# Patient Record
Sex: FEMALE | Race: WHITE | NOT HISPANIC OR LATINO | Employment: FULL TIME | URBAN - METROPOLITAN AREA
[De-identification: names, ages, dates, MRNs, and addresses within clinical notes are randomized per-mention and may not be internally consistent; named-entity substitution may affect disease eponyms.]

---

## 2018-08-07 ENCOUNTER — LAB REQUISITION (OUTPATIENT)
Dept: LAB | Facility: HOSPITAL | Age: 48
End: 2018-08-07

## 2018-08-07 DIAGNOSIS — Z00.00 ROUTINE GENERAL MEDICAL EXAMINATION AT A HEALTH CARE FACILITY: ICD-10-CM

## 2018-08-07 PROCEDURE — 36415 COLL VENOUS BLD VENIPUNCTURE: CPT

## 2019-07-22 ENCOUNTER — LAB (OUTPATIENT)
Dept: LAB | Facility: HOSPITAL | Age: 49
End: 2019-07-22

## 2019-07-22 ENCOUNTER — TRANSCRIBE ORDERS (OUTPATIENT)
Dept: ADMINISTRATIVE | Facility: HOSPITAL | Age: 49
End: 2019-07-22

## 2019-07-22 DIAGNOSIS — M25.511 RIGHT SHOULDER PAIN, UNSPECIFIED CHRONICITY: Primary | ICD-10-CM

## 2019-07-22 DIAGNOSIS — M25.511 RIGHT SHOULDER PAIN, UNSPECIFIED CHRONICITY: ICD-10-CM

## 2019-07-22 LAB
ALBUMIN SERPL-MCNC: 4.6 G/DL (ref 3.5–5.2)
ALBUMIN/GLOB SERPL: 1.7 G/DL
ALP SERPL-CCNC: 97 U/L (ref 39–117)
ALT SERPL W P-5'-P-CCNC: 22 U/L (ref 1–33)
ANION GAP SERPL CALCULATED.3IONS-SCNC: 16.1 MMOL/L (ref 5–15)
AST SERPL-CCNC: 24 U/L (ref 1–32)
BILIRUB SERPL-MCNC: 0.3 MG/DL (ref 0.2–1.2)
BUN BLD-MCNC: 14 MG/DL (ref 6–20)
BUN/CREAT SERPL: 17.3 (ref 7–25)
CALCIUM SPEC-SCNC: 9.6 MG/DL (ref 8.6–10.5)
CHLORIDE SERPL-SCNC: 99 MMOL/L (ref 98–107)
CO2 SERPL-SCNC: 22.9 MMOL/L (ref 22–29)
CREAT BLD-MCNC: 0.81 MG/DL (ref 0.57–1)
DEPRECATED RDW RBC AUTO: 47.7 FL (ref 37–54)
ERYTHROCYTE [DISTWIDTH] IN BLOOD BY AUTOMATED COUNT: 13.7 % (ref 12.3–15.4)
GFR SERPL CREATININE-BSD FRML MDRD: 75 ML/MIN/1.73
GLOBULIN UR ELPH-MCNC: 2.7 GM/DL
GLUCOSE BLD-MCNC: 83 MG/DL (ref 65–99)
HCT VFR BLD AUTO: 43.5 % (ref 34–46.6)
HGB BLD-MCNC: 13.8 G/DL (ref 12–15.9)
MCH RBC QN AUTO: 30.2 PG (ref 26.6–33)
MCHC RBC AUTO-ENTMCNC: 31.7 G/DL (ref 31.5–35.7)
MCV RBC AUTO: 95.2 FL (ref 79–97)
PLATELET # BLD AUTO: 214 10*3/MM3 (ref 140–450)
PMV BLD AUTO: 11.8 FL (ref 6–12)
POTASSIUM BLD-SCNC: 3.8 MMOL/L (ref 3.5–5.2)
PROT SERPL-MCNC: 7.3 G/DL (ref 6–8.5)
RBC # BLD AUTO: 4.57 10*6/MM3 (ref 3.77–5.28)
SODIUM BLD-SCNC: 138 MMOL/L (ref 136–145)
WBC NRBC COR # BLD: 8.19 10*3/MM3 (ref 3.4–10.8)

## 2019-07-22 PROCEDURE — 36415 COLL VENOUS BLD VENIPUNCTURE: CPT

## 2019-07-22 PROCEDURE — 85027 COMPLETE CBC AUTOMATED: CPT

## 2019-07-22 PROCEDURE — 80053 COMPREHEN METABOLIC PANEL: CPT

## 2021-01-29 ENCOUNTER — OFFICE VISIT (OUTPATIENT)
Dept: INTERNAL MEDICINE | Facility: CLINIC | Age: 51
End: 2021-01-29

## 2021-01-29 ENCOUNTER — HOSPITAL ENCOUNTER (OUTPATIENT)
Dept: ULTRASOUND IMAGING | Facility: HOSPITAL | Age: 51
Discharge: HOME | End: 2021-01-29
Admitting: INTERNAL MEDICINE

## 2021-01-29 VITALS
BODY MASS INDEX: 30.9 KG/M2 | DIASTOLIC BLOOD PRESSURE: 80 MMHG | SYSTOLIC BLOOD PRESSURE: 126 MMHG | TEMPERATURE: 97.1 F | HEART RATE: 75 BPM | WEIGHT: 181 LBS | RESPIRATION RATE: 18 BRPM | HEIGHT: 64 IN | OXYGEN SATURATION: 98 %

## 2021-01-29 DIAGNOSIS — M79.605 LEG PAIN, LEFT: Primary | ICD-10-CM

## 2021-01-29 PROCEDURE — 93970 EXTREMITY STUDY: CPT

## 2021-01-29 PROCEDURE — 99214 OFFICE O/P EST MOD 30 MIN: CPT | Performed by: INTERNAL MEDICINE

## 2021-01-29 RX ORDER — MELOXICAM 7.5 MG/1
15 TABLET ORAL DAILY
Qty: 60 TABLET | Refills: 2 | Status: SHIPPED | OUTPATIENT
Start: 2021-01-29 | End: 2021-07-09

## 2021-01-29 RX ORDER — SPIRONOLACTONE 25 MG/1
25 TABLET ORAL DAILY
COMMUNITY

## 2021-01-29 RX ORDER — PIMECROLIMUS 10 MG/G
CREAM TOPICAL
COMMUNITY
Start: 2020-11-24

## 2021-01-29 RX ORDER — DIPHENOXYLATE HYDROCHLORIDE AND ATROPINE SULFATE 2.5; .025 MG/1; MG/1
1 TABLET ORAL DAILY
COMMUNITY
End: 2021-01-29

## 2021-01-29 RX ORDER — MULTIVIT WITH MINERALS/LUTEIN
250 TABLET ORAL DAILY
COMMUNITY

## 2021-01-29 RX ORDER — TRAZODONE HYDROCHLORIDE 50 MG/1
50 TABLET ORAL
COMMUNITY
Start: 2021-01-25 | End: 2021-04-21

## 2021-01-29 RX ORDER — CLOBETASOL PROPIONATE 0.5 MG/G
CREAM TOPICAL
COMMUNITY
Start: 2020-10-29

## 2021-01-29 RX ORDER — ESTRADIOL 0.05 MG/D
1 FILM, EXTENDED RELEASE TRANSDERMAL 2 TIMES WEEKLY
COMMUNITY
Start: 2020-12-07 | End: 2022-11-11

## 2021-02-02 ENCOUNTER — TELEPHONE (OUTPATIENT)
Dept: INTERNAL MEDICINE | Facility: CLINIC | Age: 51
End: 2021-02-02

## 2021-02-02 NOTE — TELEPHONE ENCOUNTER
[facility] NEEDS A P.A ON THE FOLLOWING MEDICATION:     meloxicam (Mobic) 7.5 MG tablet [[id number]] (Order [id number])    PHARMACY:  [id number] [place] [address] 100 - [phone] [facility] [phone] FX

## 2021-03-24 ENCOUNTER — BULK ORDERING (OUTPATIENT)
Dept: CASE MANAGEMENT | Facility: OTHER | Age: 51
End: 2021-03-24

## 2021-03-24 DIAGNOSIS — Z23 IMMUNIZATION DUE: ICD-10-CM

## 2021-04-21 RX ORDER — TRAZODONE HYDROCHLORIDE 50 MG/1
TABLET ORAL
Qty: 90 TABLET | Refills: 2 | Status: SHIPPED | OUTPATIENT
Start: 2021-04-21 | End: 2021-11-22

## 2021-05-25 ENCOUNTER — TRANSCRIBE ORDERS (OUTPATIENT)
Dept: LAB | Facility: SURGERY CENTER | Age: 51
End: 2021-05-25

## 2021-05-25 ENCOUNTER — OFFICE VISIT (OUTPATIENT)
Dept: GASTROENTEROLOGY | Facility: CLINIC | Age: 51
End: 2021-05-25

## 2021-05-25 ENCOUNTER — PREP FOR SURGERY (OUTPATIENT)
Dept: SURGERY | Facility: SURGERY CENTER | Age: 51
End: 2021-05-25

## 2021-05-25 VITALS
WEIGHT: 165 LBS | HEIGHT: 64 IN | BODY MASS INDEX: 28.17 KG/M2 | OXYGEN SATURATION: 100 % | DIASTOLIC BLOOD PRESSURE: 82 MMHG | HEART RATE: 69 BPM | SYSTOLIC BLOOD PRESSURE: 140 MMHG | TEMPERATURE: 97.5 F

## 2021-05-25 DIAGNOSIS — Z01.818 OTHER SPECIFIED PRE-OPERATIVE EXAMINATION: Primary | ICD-10-CM

## 2021-05-25 DIAGNOSIS — R13.19 ESOPHAGEAL DYSPHAGIA: Primary | ICD-10-CM

## 2021-05-25 PROCEDURE — 99214 OFFICE O/P EST MOD 30 MIN: CPT | Performed by: INTERNAL MEDICINE

## 2021-05-25 RX ORDER — SODIUM CHLORIDE 0.9 % (FLUSH) 0.9 %
10 SYRINGE (ML) INJECTION AS NEEDED
Status: CANCELLED | OUTPATIENT
Start: 2021-05-25

## 2021-05-25 RX ORDER — SODIUM CHLORIDE, SODIUM LACTATE, POTASSIUM CHLORIDE, CALCIUM CHLORIDE 600; 310; 30; 20 MG/100ML; MG/100ML; MG/100ML; MG/100ML
30 INJECTION, SOLUTION INTRAVENOUS CONTINUOUS PRN
Status: CANCELLED | OUTPATIENT
Start: 2021-05-25

## 2021-05-25 RX ORDER — SODIUM CHLORIDE 0.9 % (FLUSH) 0.9 %
3 SYRINGE (ML) INJECTION EVERY 12 HOURS SCHEDULED
Status: CANCELLED | OUTPATIENT
Start: 2021-05-25

## 2021-05-25 NOTE — PROGRESS NOTES
"Chief Complaint   Patient presents with   • Difficulty Swallowing           History of Present Illness  Patient is a 50 year old female who presents today for evaluation.    Patient presents today with concerns about dysphagia. She reports when she eats certain foods, they can get stuck in her throat. She reports applying pressure to her throat at times can help the food pass down.    She previously had an EGD with dilation and reports the dysphagia improved for a period of time but has returned over the last year, worsening over the last 6 months. She reports the dysphagia improved immediately after her prior dilation. The EGD with dilation was performed around 5-7 years ago.    She had a colonoscopy 5-7 years ago and was told to repeat in 2025.    She denies any issues with GERD.    Review of Systems   HENT: Positive for trouble swallowing.         Result Review :           Vital Signs:   /82   Pulse 69   Temp 97.5 °F (36.4 °C)   Ht 162.6 cm (64\")   Wt 74.8 kg (165 lb)   SpO2 100%   BMI 28.32 kg/m²     Body mass index is 28.32 kg/m².     Physical Exam  Constitutional:       General: She is not in acute distress.     Appearance: She is well-developed.   Pulmonary:      Effort: No respiratory distress.   Skin:     Coloration: Skin is not pale.           Assessment and Plan    Diagnoses and all orders for this visit:    1. Esophageal dysphagia (Primary)       I have reviewed and confirmed the accuracy of the HPI and Assessment and Plan as documented by the APRN OBED Rodriguez        Follow Up   Return for Follow up to review results after testing complete.    Patient Instructions   Schedule EGD with dilation for further evaluation and treatment of symptoms.          Documentation by Diana CLAY acting as a scribe in the following sections on behalf of the billable provider: HPI, ROS, assessment, & plan.  "

## 2021-06-16 ENCOUNTER — LAB (OUTPATIENT)
Dept: LAB | Facility: SURGERY CENTER | Age: 51
End: 2021-06-16

## 2021-06-16 ENCOUNTER — APPOINTMENT (OUTPATIENT)
Dept: LAB | Facility: SURGERY CENTER | Age: 51
End: 2021-06-16

## 2021-06-16 DIAGNOSIS — Z01.818 OTHER SPECIFIED PRE-OPERATIVE EXAMINATION: ICD-10-CM

## 2021-06-16 LAB — SARS-COV-2 ORF1AB RESP QL NAA+PROBE: NOT DETECTED

## 2021-06-16 PROCEDURE — U0004 COV-19 TEST NON-CDC HGH THRU: HCPCS | Performed by: SURGERY

## 2021-06-16 PROCEDURE — C9803 HOPD COVID-19 SPEC COLLECT: HCPCS

## 2021-06-18 ENCOUNTER — ANESTHESIA (OUTPATIENT)
Dept: SURGERY | Facility: SURGERY CENTER | Age: 51
End: 2021-06-18

## 2021-06-18 ENCOUNTER — HOSPITAL ENCOUNTER (OUTPATIENT)
Facility: SURGERY CENTER | Age: 51
Setting detail: HOSPITAL OUTPATIENT SURGERY
Discharge: HOME | End: 2021-06-18
Attending: INTERNAL MEDICINE | Admitting: INTERNAL MEDICINE

## 2021-06-18 ENCOUNTER — ANESTHESIA EVENT (OUTPATIENT)
Dept: SURGERY | Facility: SURGERY CENTER | Age: 51
End: 2021-06-18

## 2021-06-18 VITALS
SYSTOLIC BLOOD PRESSURE: 123 MMHG | HEIGHT: 64 IN | HEART RATE: 58 BPM | TEMPERATURE: 98 F | BODY MASS INDEX: 28.34 KG/M2 | DIASTOLIC BLOOD PRESSURE: 76 MMHG | RESPIRATION RATE: 16 BRPM | OXYGEN SATURATION: 100 % | WEIGHT: 166 LBS

## 2021-06-18 DIAGNOSIS — R13.19 ESOPHAGEAL DYSPHAGIA: ICD-10-CM

## 2021-06-18 DIAGNOSIS — R13.19 ESOPHAGEAL DYSPHAGIA: Primary | ICD-10-CM

## 2021-06-18 PROCEDURE — 43239 EGD BIOPSY SINGLE/MULTIPLE: CPT | Performed by: INTERNAL MEDICINE

## 2021-06-18 PROCEDURE — 88305 TISSUE EXAM BY PATHOLOGIST: CPT | Performed by: INTERNAL MEDICINE

## 2021-06-18 PROCEDURE — 25010000002 PROPOFOL 10 MG/ML EMULSION: Performed by: ANESTHESIOLOGY

## 2021-06-18 RX ORDER — PROPOFOL 10 MG/ML
VIAL (ML) INTRAVENOUS AS NEEDED
Status: DISCONTINUED | OUTPATIENT
Start: 2021-06-18 | End: 2021-06-18 | Stop reason: SURG

## 2021-06-18 RX ORDER — SODIUM CHLORIDE, SODIUM LACTATE, POTASSIUM CHLORIDE, CALCIUM CHLORIDE 600; 310; 30; 20 MG/100ML; MG/100ML; MG/100ML; MG/100ML
30 INJECTION, SOLUTION INTRAVENOUS CONTINUOUS PRN
Status: DISCONTINUED | OUTPATIENT
Start: 2021-06-18 | End: 2021-06-18 | Stop reason: HOSPADM

## 2021-06-18 RX ORDER — MAGNESIUM HYDROXIDE 1200 MG/15ML
LIQUID ORAL AS NEEDED
Status: DISCONTINUED | OUTPATIENT
Start: 2021-06-18 | End: 2021-06-18 | Stop reason: HOSPADM

## 2021-06-18 RX ORDER — LIDOCAINE HYDROCHLORIDE 20 MG/ML
INJECTION, SOLUTION INFILTRATION; PERINEURAL AS NEEDED
Status: DISCONTINUED | OUTPATIENT
Start: 2021-06-18 | End: 2021-06-18 | Stop reason: SURG

## 2021-06-18 RX ADMIN — PROPOFOL 200 MCG/KG/MIN: 10 INJECTION, EMULSION INTRAVENOUS at 12:21

## 2021-06-18 RX ADMIN — PROPOFOL 50 MG: 10 INJECTION, EMULSION INTRAVENOUS at 12:20

## 2021-06-18 RX ADMIN — PROPOFOL 150 MG: 10 INJECTION, EMULSION INTRAVENOUS at 12:19

## 2021-06-18 RX ADMIN — SODIUM CHLORIDE, POTASSIUM CHLORIDE, SODIUM LACTATE AND CALCIUM CHLORIDE 30 ML/HR: 600; 310; 30; 20 INJECTION, SOLUTION INTRAVENOUS at 11:50

## 2021-06-18 RX ADMIN — LIDOCAINE HYDROCHLORIDE 60 MG: 20 INJECTION, SOLUTION INFILTRATION; PERINEURAL at 12:19

## 2021-06-18 NOTE — ANESTHESIA POSTPROCEDURE EVALUATION
"Patient: Tushar Martinez    Procedure Summary     Date: 06/18/21 Room / Location[facility]    Anesthesia Start: 1215 Anesthesia Stop: 1232    Procedure: ESOPHAGOGASTRODUODENOSCOPY (N/A ) Diagnosis:       Esophageal dysphagia      (Esophageal dysphagia [R13.10])    Surgeons: Rene Lund MD Provider: Alfred Underwood DO    Anesthesia Type: MAC ASA Status: 2          Anesthesia Type: MAC    Vitals  Vitals Value Taken Time   /76 06/18/21 1245   Temp 36.7 °C (98 °F) 06/18/21 1231   Pulse 58 06/18/21 1245   Resp 16 06/18/21 1245   SpO2 100 % 06/18/21 1245           Post Anesthesia Care and Evaluation    Patient location during evaluation: bedside  Patient participation: waiting for patient participation  Level of consciousness: sleepy but conscious and responsive to verbal stimuli  Pain management: adequate  Airway patency: patent  Anesthetic complications: No anesthetic complications  PONV Status: NA  Cardiovascular status: acceptable and hemodynamically stable  Respiratory status: acceptable  Hydration status: acceptable    Comments: /76 (BP Location: Left arm, Patient Position: Lying)   Pulse 58   Temp 36.7 °C (98 °F) (Infrared)   Resp 16   Ht 162.6 cm (64\")   Wt 75.3 kg (166 lb)   SpO2 100%   BMI 28.49 kg/m²       "

## 2021-06-21 LAB
LAB AP CASE REPORT: NORMAL
LAB AP CLINICAL INFORMATION: NORMAL
PATH REPORT.FINAL DX SPEC: NORMAL
PATH REPORT.GROSS SPEC: NORMAL

## 2021-06-29 ENCOUNTER — HOSPITAL ENCOUNTER (OUTPATIENT)
Dept: GENERAL RADIOLOGY | Facility: HOSPITAL | Age: 51
Discharge: HOME | End: 2021-06-29
Admitting: INTERNAL MEDICINE

## 2021-06-29 DIAGNOSIS — R13.19 ESOPHAGEAL DYSPHAGIA: ICD-10-CM

## 2021-06-29 PROCEDURE — 74221 X-RAY XM ESOPHAGUS 2CNTRST: CPT

## 2021-06-29 RX ADMIN — BARIUM SULFATE 183 ML: 960 POWDER, FOR SUSPENSION ORAL at 08:05

## 2021-06-29 RX ADMIN — BARIUM SULFATE 135 ML: 980 POWDER, FOR SUSPENSION ORAL at 08:07

## 2021-06-29 RX ADMIN — ANTACID/ANTIFLATULENT 1 TABLET: 380; 550; 10; 10 GRANULE, EFFERVESCENT ORAL at 08:07

## 2021-06-29 RX ADMIN — BARIUM SULFATE 700 MG: 700 TABLET ORAL at 08:05

## 2021-07-06 NOTE — PROGRESS NOTES
"Chief Complaint   Patient presents with   • Difficulty Swallowing         History of Present Illness  Patient is a 50-year-old female who presents today for follow-up.  She was seen in the office May 2021 for dysphagia    She had an EGD June 2021 with inlet patch, otherwise the esophagus was normal endoscopically.  She underwent an esophagram to evaluate for Zenker's diverticulum with no evidence of diverticulum.  The esophagram did show mild intermittent tertiary wave formation with subtle intraesophageal reflux and reflux with patient on her right side.  It was otherwise unremarkable.     Patient presents today for follow-up.  She reports persistent issues with dysphagia with no change in symptoms.  She localizes the sensation to the center and left upper portion of her throat.  She reports she feels as though something gets stuck in that area.  When that sensation occurs if she applies pressure above the area of the cricoid sometimes it will help and she will feel as though the substance is then able to pass down.    She denies any heartburn or reflux.  Denies any nausea or vomiting.  Denies any abdominal pain.    She has never had ENT evaluation or video swallow study.  She does report that when she experienced the symptoms in the past, dilation of the esophagus was somewhat helpful for period of time.         Result Review :       Tissue Pathology Exam (06/18/2021 12:24)   FL Esophagram Complete Double-Contrast (06/29/2021 08:03)   UPPER GI ENDOSCOPY (06/18/2021 12:07)   UPPER GI ENDOSCOPY (06/18/2021 12:07)   Office Visit with Rene Lund MD (05/25/2021)       Vital Signs:   /80   Pulse 62   Temp 97.3 °F (36.3 °C)   Ht 162.6 cm (64\")   Wt 75.7 kg (166 lb 12.8 oz)   SpO2 99%   BMI 28.63 kg/m²     Body mass index is 28.63 kg/m².     Physical Exam  Vitals reviewed.   Constitutional:       General: She is not in acute distress.     Appearance: She is well-developed.   HENT:      Head: " Normocephalic and atraumatic.   Pulmonary:      Effort: Pulmonary effort is normal. No respiratory distress.   Skin:     General: Skin is dry.      Coloration: Skin is not pale.   Neurological:      Mental Status: She is alert and oriented to person, place, and time.   Psychiatric:         Thought Content: Thought content normal.           Assessment and Plan    Diagnoses and all orders for this visit:    1. Dysphagia, unspecified type (Primary)  -     Ambulatory Referral to ENT (Otolaryngology)         Discussion  Reviewed EGD and esophagram findings at today's office visit.  No evidence of esophagitis, esophageal stricture, or Zenker's diverticulum.  Due to location of complaint and persistent symptoms, will arrange for ENT consultation.  Dependent upon their findings and clinical course, we may need to consider video swallow study with speech therapy, esophageal manometry, or could consider empiric dilation which we discussed today.  Will await ENT recommendations and proceed forward from there.          Follow Up   Return if symptoms worsen or fail to improve.    Patient Instructions   Refer to ENT for further evaluation of symptoms.

## 2021-07-09 ENCOUNTER — OFFICE VISIT (OUTPATIENT)
Dept: GASTROENTEROLOGY | Facility: CLINIC | Age: 51
End: 2021-07-09

## 2021-07-09 VITALS
WEIGHT: 166.8 LBS | SYSTOLIC BLOOD PRESSURE: 130 MMHG | DIASTOLIC BLOOD PRESSURE: 80 MMHG | BODY MASS INDEX: 28.48 KG/M2 | OXYGEN SATURATION: 99 % | HEIGHT: 64 IN | HEART RATE: 62 BPM | TEMPERATURE: 97.3 F

## 2021-07-09 DIAGNOSIS — R13.10 DYSPHAGIA, UNSPECIFIED TYPE: Primary | ICD-10-CM

## 2021-07-09 PROCEDURE — 99214 OFFICE O/P EST MOD 30 MIN: CPT | Performed by: NURSE PRACTITIONER

## 2021-09-16 ENCOUNTER — LAB (OUTPATIENT)
Dept: LAB | Facility: HOSPITAL | Age: 51
End: 2021-09-16

## 2021-09-16 ENCOUNTER — TRANSCRIBE ORDERS (OUTPATIENT)
Dept: ADMINISTRATIVE | Facility: HOSPITAL | Age: 51
End: 2021-09-16

## 2021-09-16 DIAGNOSIS — Z01.818 PRE-OP EXAM: Primary | ICD-10-CM

## 2021-09-16 DIAGNOSIS — Z01.818 PRE-OP EXAM: ICD-10-CM

## 2021-09-16 LAB
ALBUMIN SERPL-MCNC: 4.8 G/DL (ref 3.5–5.2)
ALBUMIN/GLOB SERPL: 2.1 G/DL
ALP SERPL-CCNC: 85 U/L (ref 39–117)
ALT SERPL W P-5'-P-CCNC: 14 U/L (ref 1–33)
ANION GAP SERPL CALCULATED.3IONS-SCNC: 9.7 MMOL/L (ref 5–15)
AST SERPL-CCNC: 18 U/L (ref 1–32)
BILIRUB SERPL-MCNC: 0.3 MG/DL (ref 0–1.2)
BUN SERPL-MCNC: 16 MG/DL (ref 6–20)
BUN/CREAT SERPL: 18.2 (ref 7–25)
CALCIUM SPEC-SCNC: 9.7 MG/DL (ref 8.6–10.5)
CHLORIDE SERPL-SCNC: 102 MMOL/L (ref 98–107)
CO2 SERPL-SCNC: 26.3 MMOL/L (ref 22–29)
CREAT SERPL-MCNC: 0.88 MG/DL (ref 0.57–1)
DEPRECATED RDW RBC AUTO: 41.6 FL (ref 37–54)
ERYTHROCYTE [DISTWIDTH] IN BLOOD BY AUTOMATED COUNT: 12.3 % (ref 12.3–15.4)
GFR SERPL CREATININE-BSD FRML MDRD: 67 ML/MIN/1.73
GLOBULIN UR ELPH-MCNC: 2.3 GM/DL
GLUCOSE SERPL-MCNC: 94 MG/DL (ref 65–99)
HCT VFR BLD AUTO: 41.5 % (ref 34–46.6)
HGB BLD-MCNC: 13.9 G/DL (ref 12–15.9)
MCH RBC QN AUTO: 30.8 PG (ref 26.6–33)
MCHC RBC AUTO-ENTMCNC: 33.5 G/DL (ref 31.5–35.7)
MCV RBC AUTO: 92 FL (ref 79–97)
PLATELET # BLD AUTO: 196 10*3/MM3 (ref 140–450)
PMV BLD AUTO: 12.5 FL (ref 6–12)
POTASSIUM SERPL-SCNC: 4.8 MMOL/L (ref 3.5–5.2)
PROT SERPL-MCNC: 7.1 G/DL (ref 6–8.5)
RBC # BLD AUTO: 4.51 10*6/MM3 (ref 3.77–5.28)
SODIUM SERPL-SCNC: 138 MMOL/L (ref 136–145)
WBC # BLD AUTO: 7.19 10*3/MM3 (ref 3.4–10.8)

## 2021-09-16 PROCEDURE — 36415 COLL VENOUS BLD VENIPUNCTURE: CPT

## 2021-09-16 PROCEDURE — 85027 COMPLETE CBC AUTOMATED: CPT

## 2021-09-16 PROCEDURE — 80053 COMPREHEN METABOLIC PANEL: CPT

## 2021-11-22 RX ORDER — TRAZODONE HYDROCHLORIDE 50 MG/1
TABLET ORAL
Qty: 90 TABLET | Refills: 2 | Status: SHIPPED | OUTPATIENT
Start: 2021-11-22 | End: 2022-09-30

## 2021-11-22 NOTE — TELEPHONE ENCOUNTER
Rx Refill Note  Requested Prescriptions     Pending Prescriptions Disp Refills   • traZODone (DESYREL) 50 MG tablet [Pharmacy Med Name: TRAZODONE 50 MG TABLET] 90 tablet 2     Sig: TAKE 1 TABLET BY MOUTH EVERYDAY AT BEDTIME      Last office visit with prescribing clinician: 1/29/2021      Next office visit with prescribing clinician: Visit date not found            Raoul Dumont MA  11/22/21, 07:53 EST

## 2022-09-30 RX ORDER — TRAZODONE HYDROCHLORIDE 50 MG/1
TABLET ORAL
Qty: 90 TABLET | Refills: 2 | Status: SHIPPED | OUTPATIENT
Start: 2022-09-30

## 2022-09-30 NOTE — TELEPHONE ENCOUNTER
Rx Refill Note  Requested Prescriptions     Pending Prescriptions Disp Refills   • traZODone (DESYREL) 50 MG tablet [Pharmacy Med Name: TRAZODONE 50 MG TABLET] 90 tablet 2     Sig: TAKE 1 TABLET BY MOUTH EVERYDAY AT BEDTIME      Last office visit with prescribing clinician: 1/29/2021      Next office visit with prescribing clinician: Visit date not found            Raoul Dumont MA  09/30/22, 09:55 EDT

## 2022-10-03 ENCOUNTER — OFFICE VISIT (OUTPATIENT)
Dept: INTERNAL MEDICINE | Facility: CLINIC | Age: 52
End: 2022-10-03

## 2022-10-03 VITALS
WEIGHT: 178.2 LBS | HEIGHT: 64 IN | RESPIRATION RATE: 18 BRPM | HEART RATE: 72 BPM | TEMPERATURE: 98.4 F | SYSTOLIC BLOOD PRESSURE: 142 MMHG | DIASTOLIC BLOOD PRESSURE: 84 MMHG | OXYGEN SATURATION: 98 % | BODY MASS INDEX: 30.42 KG/M2

## 2022-10-03 DIAGNOSIS — Z11.4 ENCOUNTER FOR SCREENING FOR HIV: ICD-10-CM

## 2022-10-03 DIAGNOSIS — Z11.59 ENCOUNTER FOR HCV SCREENING TEST FOR LOW RISK PATIENT: ICD-10-CM

## 2022-10-03 DIAGNOSIS — Z00.00 WELL ADULT EXAM: Primary | ICD-10-CM

## 2022-10-03 PROCEDURE — 99396 PREV VISIT EST AGE 40-64: CPT | Performed by: INTERNAL MEDICINE

## 2022-10-03 PROCEDURE — 99213 OFFICE O/P EST LOW 20 MIN: CPT | Performed by: INTERNAL MEDICINE

## 2022-10-03 RX ORDER — OMEPRAZOLE 40 MG/1
40 CAPSULE, DELAYED RELEASE ORAL DAILY
Status: ON HOLD | COMMUNITY
Start: 2022-09-09 | End: 2022-10-24

## 2022-10-03 RX ORDER — TIRZEPATIDE 2.5 MG/.5ML
2.5 INJECTION, SOLUTION SUBCUTANEOUS WEEKLY
Qty: 6 ML | Refills: 2 | Status: SHIPPED | OUTPATIENT
Start: 2022-10-03 | End: 2022-11-04

## 2022-10-03 NOTE — PROGRESS NOTES
"Chief Complaint   Patient presents with   • Weight Check     Questions regarding weight    • Annual Exam       Subjective   Tushar Martinez is a 52 y.o. female.     History of Present Illness  Obesity, working on diet, exercise, limiting calories; has not had much success with her various diets the last few months       The following portions of the patient's history were reviewed and updated as appropriate: allergies, current medications, past family history, past medical history, past social history, past surgical history, and problem list.    Review of Systems      Objective   Body mass index is 30.59 kg/m².   Vitals:    10/03/22 0830   BP: 142/84   Pulse: 72   Resp: 18   Temp: 98.4 °F (36.9 °C)   SpO2: 98%   Weight: 80.8 kg (178 lb 3.2 oz)   Height: 162.6 cm (64\")        Physical Exam  Vitals and nursing note reviewed.   Constitutional:       Appearance: Normal appearance.   HENT:      Head: Normocephalic and atraumatic.      Right Ear: Tympanic membrane, ear canal and external ear normal.      Left Ear: Tympanic membrane, ear canal and external ear normal.      Nose: Nose normal.      Mouth/Throat:      Mouth: Mucous membranes are moist.      Pharynx: Oropharynx is clear.   Eyes:      Extraocular Movements: Extraocular movements intact.      Conjunctiva/sclera: Conjunctivae normal.      Pupils: Pupils are equal, round, and reactive to light.   Cardiovascular:      Rate and Rhythm: Normal rate and regular rhythm.      Pulses: Normal pulses.      Heart sounds: Normal heart sounds.   Pulmonary:      Effort: Pulmonary effort is normal.      Breath sounds: Normal breath sounds. No wheezing, rhonchi or rales.   Abdominal:      General: Abdomen is flat. There is no distension.      Palpations: Abdomen is soft.      Tenderness: There is no abdominal tenderness.   Musculoskeletal:         General: Normal range of motion.      Cervical back: Normal range of motion and neck supple.      Right lower leg: No edema.      Left " lower leg: No edema.   Skin:     General: Skin is warm and dry.      Capillary Refill: Capillary refill takes less than 2 seconds.      Findings: No rash.   Neurological:      General: No focal deficit present.      Mental Status: She is alert and oriented to person, place, and time. Mental status is at baseline.   Psychiatric:         Mood and Affect: Mood normal.         Behavior: Behavior normal.           Current Outpatient Medications:   •  Calcium Carbonate-Vitamin D (OSCAL-500) 500-400 MG-UNIT per tablet, Take  by mouth., Disp: , Rfl:   •  clobetasol (TEMOVATE) 0.05 % cream, APPLY TO AFFECTED AREA OF ARMS TWICE DAILY FOR 3 WEEKS THEN AS NEEDED, Disp: , Rfl:   •  GLUCOSAMINE-CHONDROITIN PO, Take  by mouth Daily., Disp: , Rfl:   •  Multiple Vitamin (MULTI VITAMIN PO), Take  by mouth Daily., Disp: , Rfl:   •  omeprazole (priLOSEC) 40 MG capsule, Take 40 mg by mouth Daily., Disp: , Rfl:   •  pimecrolimus (ELIDEL) 1 % cream, APPLY TO AFFECTED AREA OF ARM TWICE DAILY AS NEEDED FOR MAINTENANCE, Disp: , Rfl:   •  spironolactone (ALDACTONE) 25 MG tablet, Take 25 mg by mouth Daily., Disp: , Rfl:   •  traZODone (DESYREL) 50 MG tablet, TAKE 1 TABLET BY MOUTH EVERYDAY AT BEDTIME, Disp: 90 tablet, Rfl: 2  •  vitamin C (ASCORBIC ACID) 250 MG tablet, Take 250 mg by mouth Daily., Disp: , Rfl:   •  estradiol (MINIVELLE, VIVELLE-DOT) 0.05 MG/24HR patch, Place 1 patch on the skin as directed by provider 2 (Two) Times a Week., Disp: , Rfl:   •  Tirzepatide (Mounjaro) 2.5 MG/0.5ML solution pen-injector, Inject 2.5 mg under the skin into the appropriate area as directed 1 (One) Time Per Week., Disp: 6 mL, Rfl: 2     Lab Results   Component Value Date    HGBA1C 5.3 09/04/2020    TSH 3.570 09/04/2020        Health Maintenance   Topic Date Due   • LIPID PANEL  Never done   • ZOSTER VACCINE (2 of 2) 11/25/2022   • MAMMOGRAM  10/25/2023   • TDAP/TD VACCINES (3 - Td or Tdap) 09/30/2030   • INFLUENZA VACCINE  Completed   • PAP SMEAR   Discontinued        Immunization History   Administered Date(s) Administered   • COVID-19 (PFIZER) PURPLE CAP 04/01/2021, 04/22/2021, 12/02/2021   • Flu Vaccine Intradermal Quad 18-64YR 09/29/2022   • Tdap 09/30/2020         Assessment & Plan   Diagnoses and all orders for this visit:    1. Well adult exam (Primary)  -     Comprehensive Metabolic Panel  -     Lipid Panel  -     Hemoglobin A1c  -     CBC & Differential  -     TSH  -     T4, Free    2. Encounter for screening for HIV  -     HIV-1 / O / 2 Ag / Antibody 4th Generation    3. Encounter for HCV screening test for low risk patient  -     Hepatitis C Antibody    4. BMI 30.0-30.9,adult    Other orders  -     Tirzepatide (Mounjaro) 2.5 MG/0.5ML solution pen-injector; Inject 2.5 mg under the skin into the appropriate area as directed 1 (One) Time Per Week.  Dispense: 6 mL; Refill: 2    - bmi 30, counseled on obesity, limiting calories, increased exercise; start mounjaro, counseled on risks and benefits         Well adult exam  - labs checked and evaluated    PAP - with GYN, s/p hysterectromy and approproiate cervical screening  Mammogram - 10/25/21, screening  Colonoscopy - 11/2/17  Glaucoma - yearly  AAA - na  Lung cancer - na  DXA - at 65  HIV - checking  HCV - checking  DM - checking  HLD - checking  Smoking - no  Depression - no, elb2gdf  Vaccines - up to date  Falls - no  Alcohol Screening - no    Discussed mental health, sexual health, substance use, abuse, anticipatory guidance given.      No follow-ups on file.     Iam Salcido MD  [facility] [place]  Internal Medicine and Pediatrics  Phone: [phone]  Fax: [phone]

## 2022-10-04 LAB
ALBUMIN SERPL-MCNC: 4.7 G/DL (ref 3.5–5.2)
ALBUMIN/GLOB SERPL: 2.4 G/DL
ALP SERPL-CCNC: 92 U/L (ref 39–117)
ALT SERPL-CCNC: 13 U/L (ref 1–33)
AST SERPL-CCNC: 22 U/L (ref 1–32)
BASOPHILS # BLD AUTO: 0.03 10*3/MM3 (ref 0–0.2)
BASOPHILS NFR BLD AUTO: 0.5 % (ref 0–1.5)
BILIRUB SERPL-MCNC: 0.3 MG/DL (ref 0–1.2)
BUN SERPL-MCNC: 16 MG/DL (ref 6–20)
BUN/CREAT SERPL: 20.3 (ref 7–25)
CALCIUM SERPL-MCNC: 9.3 MG/DL (ref 8.6–10.5)
CHLORIDE SERPL-SCNC: 103 MMOL/L (ref 98–107)
CHOLEST SERPL-MCNC: 195 MG/DL (ref 0–200)
CO2 SERPL-SCNC: 28 MMOL/L (ref 22–29)
CREAT SERPL-MCNC: 0.79 MG/DL (ref 0.57–1)
EGFRCR SERPLBLD CKD-EPI 2021: 89.6 ML/MIN/1.73
EOSINOPHIL # BLD AUTO: 0.17 10*3/MM3 (ref 0–0.4)
EOSINOPHIL NFR BLD AUTO: 2.9 % (ref 0.3–6.2)
ERYTHROCYTE [DISTWIDTH] IN BLOOD BY AUTOMATED COUNT: 12.8 % (ref 12.3–15.4)
GLOBULIN SER CALC-MCNC: 2 GM/DL
GLUCOSE SERPL-MCNC: 95 MG/DL (ref 65–99)
HBA1C MFR BLD: 5.5 % (ref 4.8–5.6)
HCT VFR BLD AUTO: 35.2 % (ref 34–46.6)
HCV AB S/CO SERPL IA: <0.1 S/CO RATIO (ref 0–0.9)
HDLC SERPL-MCNC: 74 MG/DL (ref 40–60)
HGB BLD-MCNC: 11.9 G/DL (ref 12–15.9)
HIV 1+2 AB+HIV1 P24 AG SERPL QL IA: NON REACTIVE
IMM GRANULOCYTES # BLD AUTO: 0.01 10*3/MM3 (ref 0–0.05)
IMM GRANULOCYTES NFR BLD AUTO: 0.2 % (ref 0–0.5)
LDLC SERPL CALC-MCNC: 109 MG/DL (ref 0–100)
LYMPHOCYTES # BLD AUTO: 1.25 10*3/MM3 (ref 0.7–3.1)
LYMPHOCYTES NFR BLD AUTO: 21.6 % (ref 19.6–45.3)
MCH RBC QN AUTO: 29 PG (ref 26.6–33)
MCHC RBC AUTO-ENTMCNC: 33.8 G/DL (ref 31.5–35.7)
MCV RBC AUTO: 85.6 FL (ref 79–97)
MONOCYTES # BLD AUTO: 0.36 10*3/MM3 (ref 0.1–0.9)
MONOCYTES NFR BLD AUTO: 6.2 % (ref 5–12)
NEUTROPHILS # BLD AUTO: 3.96 10*3/MM3 (ref 1.7–7)
NEUTROPHILS NFR BLD AUTO: 68.6 % (ref 42.7–76)
NRBC BLD AUTO-RTO: 0 /100 WBC (ref 0–0.2)
PLATELET # BLD AUTO: 223 10*3/MM3 (ref 140–450)
POTASSIUM SERPL-SCNC: 4.1 MMOL/L (ref 3.5–5.2)
PROT SERPL-MCNC: 6.7 G/DL (ref 6–8.5)
RBC # BLD AUTO: 4.11 10*6/MM3 (ref 3.77–5.28)
SODIUM SERPL-SCNC: 141 MMOL/L (ref 136–145)
T4 FREE SERPL-MCNC: 1.15 NG/DL (ref 0.93–1.7)
TRIGL SERPL-MCNC: 68 MG/DL (ref 0–150)
TSH SERPL DL<=0.005 MIU/L-ACNC: 5.72 UIU/ML (ref 0.27–4.2)
VLDLC SERPL CALC-MCNC: 12 MG/DL (ref 5–40)
WBC # BLD AUTO: 5.78 10*3/MM3 (ref 3.4–10.8)

## 2022-10-21 ENCOUNTER — PREP FOR SURGERY (OUTPATIENT)
Dept: SURGERY | Facility: SURGERY CENTER | Age: 52
End: 2022-10-21

## 2022-10-21 ENCOUNTER — OFFICE VISIT (OUTPATIENT)
Dept: GASTROENTEROLOGY | Facility: CLINIC | Age: 52
End: 2022-10-21

## 2022-10-21 VITALS
HEART RATE: 70 BPM | DIASTOLIC BLOOD PRESSURE: 73 MMHG | SYSTOLIC BLOOD PRESSURE: 119 MMHG | WEIGHT: 173 LBS | HEIGHT: 64 IN | BODY MASS INDEX: 29.53 KG/M2 | OXYGEN SATURATION: 100 % | TEMPERATURE: 97.5 F

## 2022-10-21 DIAGNOSIS — R13.19 ESOPHAGEAL DYSPHAGIA: Primary | ICD-10-CM

## 2022-10-21 PROCEDURE — 99214 OFFICE O/P EST MOD 30 MIN: CPT | Performed by: NURSE PRACTITIONER

## 2022-10-21 RX ORDER — SODIUM CHLORIDE 0.9 % (FLUSH) 0.9 %
10 SYRINGE (ML) INJECTION AS NEEDED
Status: CANCELLED | OUTPATIENT
Start: 2022-10-21

## 2022-10-21 RX ORDER — SODIUM CHLORIDE, SODIUM LACTATE, POTASSIUM CHLORIDE, CALCIUM CHLORIDE 600; 310; 30; 20 MG/100ML; MG/100ML; MG/100ML; MG/100ML
30 INJECTION, SOLUTION INTRAVENOUS CONTINUOUS PRN
Status: CANCELLED | OUTPATIENT
Start: 2022-10-21

## 2022-10-21 RX ORDER — SODIUM CHLORIDE 0.9 % (FLUSH) 0.9 %
3 SYRINGE (ML) INJECTION EVERY 12 HOURS SCHEDULED
Status: CANCELLED | OUTPATIENT
Start: 2022-10-21

## 2022-10-21 NOTE — PROGRESS NOTES
"Chief Complaint   Patient presents with   • Difficulty Swallowing         History of Present Illness  Patient is a 52-year-old female who presents today for Follow-up.  She was last seen in the office July 2021.  She has a history of GERD and dysphagia.    Patient presents today for follow-up with concerns about worsening dysphagia.  She reports over the last few months she feels as though dysphagia has worsened.  When she swallows she feels solid food is moving slowly through her lower throat and upper chest.  She had 1 episode of forced regurgitation when this occurred.  This is only occurring with solid foods, no difficulty with liquids.    She denies any heartburn or reflux.    She has met with ENT since her last office visit who told her she had silent reflux and started omeprazole.  She has been on this for around 6 months with no change in symptoms.    She had an EGD in 2017 With empiric dilation with savory dilator to 48 [place].  Patient reports this helped her symptoms significantly for greater than 1 year.    She denies any abdominal pain, nausea, or vomiting.  Denies any bowel complaints or blood in the stool.     Result Review :       SCANNED - COLONOSCOPY (11/02/2017)   Office Visit with Diana Ley APRN (07/09/2021)   Tissue Pathology Exam (06/18/2021 12:24)   FL Esophagram Complete Double-Contrast (06/29/2021 08:03)   UPPER GI ENDOSCOPY (06/18/2021 12:07)   UPPER GI ENDOSCOPY (06/18/2021 12:07)   Office Visit with Rene Lund MD (05/25/2021)       Vital Signs:   /73   Pulse 70   Temp 97.5 °F (36.4 °C)   Ht 162.6 cm (64\")   Wt 78.5 kg (173 lb)   SpO2 100%   BMI 29.70 kg/m²     Body mass index is 29.7 kg/m².     Physical Exam  Vitals reviewed.   Constitutional:       General: She is not in acute distress.     Appearance: She is well-developed.   HENT:      Head: Normocephalic and atraumatic.   Pulmonary:      Effort: Pulmonary effort is normal. No respiratory distress. "   Abdominal:      General: Abdomen is flat. Bowel sounds are normal. There is no distension.      Palpations: Abdomen is soft.      Tenderness: There is no abdominal tenderness.   Skin:     General: Skin is dry.      Coloration: Skin is not pale.   Neurological:      Mental Status: She is alert and oriented to person, place, and time.   Psychiatric:         Thought Content: Thought content normal.           Assessment and Plan    Diagnoses and all orders for this visit:    1. Esophageal dysphagia (Primary)         Discussion  Patient presents today for follow-up with concerns about worsening dysphagia.  We will schedule updated EGD with empiric dilation of the esophagus.  If this does not lead to any improvement, would then recommend esophageal manometry and 24-hour pH impedance testing which we discussed today.  As omeprazole has not led to any improvement in symptoms, discussed with patient I do feel it is okay for her to discontinue this.          Follow Up   Return for Follow up to review results after testing complete.    Patient Instructions   Schedule EGD with dilation for further evaluation and treatment of dysphagia.

## 2022-10-24 ENCOUNTER — ANESTHESIA EVENT (OUTPATIENT)
Dept: SURGERY | Facility: SURGERY CENTER | Age: 52
End: 2022-10-24

## 2022-10-24 ENCOUNTER — HOSPITAL ENCOUNTER (OUTPATIENT)
Facility: SURGERY CENTER | Age: 52
Setting detail: HOSPITAL OUTPATIENT SURGERY
Discharge: HOME | End: 2022-10-24
Attending: INTERNAL MEDICINE | Admitting: INTERNAL MEDICINE

## 2022-10-24 ENCOUNTER — ANESTHESIA (OUTPATIENT)
Dept: SURGERY | Facility: SURGERY CENTER | Age: 52
End: 2022-10-24

## 2022-10-24 VITALS
DIASTOLIC BLOOD PRESSURE: 81 MMHG | BODY MASS INDEX: 29.02 KG/M2 | SYSTOLIC BLOOD PRESSURE: 132 MMHG | WEIGHT: 170 LBS | TEMPERATURE: 97.2 F | HEART RATE: 76 BPM | OXYGEN SATURATION: 100 % | RESPIRATION RATE: 16 BRPM | HEIGHT: 64 IN

## 2022-10-24 DIAGNOSIS — R13.19 ESOPHAGEAL DYSPHAGIA: ICD-10-CM

## 2022-10-24 PROCEDURE — 43239 EGD BIOPSY SINGLE/MULTIPLE: CPT | Performed by: INTERNAL MEDICINE

## 2022-10-24 PROCEDURE — 88305 TISSUE EXAM BY PATHOLOGIST: CPT | Performed by: INTERNAL MEDICINE

## 2022-10-24 PROCEDURE — 25010000002 PROPOFOL 10 MG/ML EMULSION: Performed by: ANESTHESIOLOGY

## 2022-10-24 RX ORDER — LIDOCAINE HYDROCHLORIDE 20 MG/ML
INJECTION, SOLUTION INFILTRATION; PERINEURAL AS NEEDED
Status: DISCONTINUED | OUTPATIENT
Start: 2022-10-24 | End: 2022-10-24 | Stop reason: SURG

## 2022-10-24 RX ORDER — PROPOFOL 10 MG/ML
VIAL (ML) INTRAVENOUS AS NEEDED
Status: DISCONTINUED | OUTPATIENT
Start: 2022-10-24 | End: 2022-10-24 | Stop reason: SURG

## 2022-10-24 RX ORDER — SODIUM CHLORIDE, SODIUM LACTATE, POTASSIUM CHLORIDE, CALCIUM CHLORIDE 600; 310; 30; 20 MG/100ML; MG/100ML; MG/100ML; MG/100ML
INJECTION, SOLUTION INTRAVENOUS CONTINUOUS PRN
Status: DISCONTINUED | OUTPATIENT
Start: 2022-10-24 | End: 2022-10-24 | Stop reason: SURG

## 2022-10-24 RX ORDER — SODIUM CHLORIDE 0.9 % (FLUSH) 0.9 %
10 SYRINGE (ML) INJECTION AS NEEDED
Status: DISCONTINUED | OUTPATIENT
Start: 2022-10-24 | End: 2022-10-24 | Stop reason: HOSPADM

## 2022-10-24 RX ORDER — SODIUM CHLORIDE 0.9 % (FLUSH) 0.9 %
3 SYRINGE (ML) INJECTION EVERY 12 HOURS SCHEDULED
Status: DISCONTINUED | OUTPATIENT
Start: 2022-10-24 | End: 2022-10-24 | Stop reason: HOSPADM

## 2022-10-24 RX ORDER — SODIUM CHLORIDE, SODIUM LACTATE, POTASSIUM CHLORIDE, CALCIUM CHLORIDE 600; 310; 30; 20 MG/100ML; MG/100ML; MG/100ML; MG/100ML
30 INJECTION, SOLUTION INTRAVENOUS CONTINUOUS PRN
Status: DISCONTINUED | OUTPATIENT
Start: 2022-10-24 | End: 2022-10-24 | Stop reason: HOSPADM

## 2022-10-24 RX ADMIN — LIDOCAINE HYDROCHLORIDE 60 MG: 20 INJECTION, SOLUTION INFILTRATION; PERINEURAL at 09:17

## 2022-10-24 RX ADMIN — PROPOFOL 100 MG: 10 INJECTION, EMULSION INTRAVENOUS at 09:17

## 2022-10-24 RX ADMIN — SODIUM CHLORIDE, SODIUM LACTATE, POTASSIUM CHLORIDE, AND CALCIUM CHLORIDE: .6; .31; .03; .02 INJECTION, SOLUTION INTRAVENOUS at 09:12

## 2022-10-24 RX ADMIN — SODIUM CHLORIDE, POTASSIUM CHLORIDE, SODIUM LACTATE AND CALCIUM CHLORIDE 30 ML/HR: 600; 310; 30; 20 INJECTION, SOLUTION INTRAVENOUS at 08:56

## 2022-10-24 RX ADMIN — PROPOFOL 200 MCG/KG/MIN: 10 INJECTION, EMULSION INTRAVENOUS at 09:17

## 2022-10-24 NOTE — ANESTHESIA POSTPROCEDURE EVALUATION
Patient: Tushar Martinez    Procedure Summary     Date: 10/24/22 Room / Location: [facility]  / [facility]    Anesthesia Start: 0912 Anesthesia Stop: 0926    Procedure: ESOPHAGOGASTRODUODENOSCOPY Diagnosis:       Esophageal dysphagia      (Esophageal dysphagia [R13.19])    Surgeons: Rene Lund MD Provider: Herb Perales MD    Anesthesia Type: MAC ASA Status: 2          Anesthesia Type: MAC    Vitals  Vitals Value Taken Time   /81 10/24/22 0948   Temp 36.2 °C (97.2 °F) 10/24/22 0926   Pulse 76 10/24/22 0948   Resp 16 10/24/22 0948   SpO2 100 % 10/24/22 0948           Post Anesthesia Care and Evaluation    Level of consciousness: awake  Pain management: satisfactory to patient    Airway patency: patent  Anesthetic complications: No anesthetic complications  PONV Status: controlled  Cardiovascular status: acceptable  Respiratory status: acceptable  Hydration status: acceptable

## 2022-10-24 NOTE — ANESTHESIA PREPROCEDURE EVALUATION
Anesthesia Evaluation     Patient summary reviewed   NPO Solid Status: > 6 hours  NPO Liquid Status: > 6 hours           Airway   Mallampati: I  TM distance: >3 FB  Neck ROM: full  Dental - normal exam     Pulmonary    (-) COPD, asthma, sleep apnea  Cardiovascular     (+) hyperlipidemia,   (-) hypertension, CAD, dysrhythmias, angina, cardiac stents      Neuro/Psych  (-) seizures, CVA, syncope  GI/Hepatic/Renal/Endo    (+)   diabetes mellitus type 2,   (-) GERD, liver disease, no renal disease, no thyroid disorder    Musculoskeletal     Abdominal    Substance History      OB/GYN          Other                      Anesthesia Plan    ASA 2     MAC       Anesthetic plan, risks, benefits, and alternatives have been provided, discussed and informed consent has been obtained with: patient.        CODE STATUS:

## 2022-10-24 NOTE — H&P
No chief complaint on file.      HPI  dysphagia         Problem List:    Patient Active Problem List   Diagnosis   • Esophageal dysphagia       Medical History:    Past Medical History:   Diagnosis Date   • Acute sinusitis, unspecified    • Breast nodule     L   • Cellulitis, unspecified    • De Quervain's fracture of left wrist    • H/O total hysterectomy    • Hyperlipidemia     UNSPECIFIED   • Insomnia, unspecified    • Obesity, unspecified    • Pain in unspecified joint    • Raynaud's syndrome without gangrene         Social History:    Social History     Socioeconomic History   • Marital status:    Tobacco Use   • Smoking status: Never   • Smokeless tobacco: Never   Vaping Use   • Vaping Use: Never used   Substance and Sexual Activity   • Alcohol use: Yes     Alcohol/week: 4.0 standard drinks     Types: 4 Glasses of wine per week     Comment: Occasionally   • Drug use: Never   • Sexual activity: Yes     Partners: Male     Birth control/protection: Vasectomy, Hysterectomy       Family History:   Family History   Problem Relation Age of Onset   • Heart failure Father    • Diabetes Father    • Colon cancer Neg Hx    • Colon polyps Neg Hx    • Crohn's disease Neg Hx    • Irritable bowel syndrome Neg Hx    • Ulcerative colitis Neg Hx        Surgical History:   Past Surgical History:   Procedure Laterality Date   • COLONOSCOPY     • ELBOW ARTHROSCOPY Left    • ENDOSCOPY  2017   • ENDOSCOPY N/A 06/18/2021    Procedure: ESOPHAGOGASTRODUODENOSCOPY;  Surgeon: Rene Lund MD;  [place] [facility];  Service: Gastroenterology;  Laterality: N/A;  inlet patch, normal esophagus   • HYSTERECTOMY     • SHOULDER ARTHROSCOPY Right    • TUBAL ABDOMINAL LIGATION     • UPPER GASTROINTESTINAL ENDOSCOPY     • WRIST ARTHROSCOPY Left        No current facility-administered medications for this encounter.    Current Outpatient Medications:   •  Calcium Carbonate-Vitamin D (OSCAL-500) 500-400 MG-UNIT per tablet, Take  by  mouth., Disp: , Rfl:   •  clobetasol (TEMOVATE) 0.05 % cream, APPLY TO AFFECTED AREA OF ARMS TWICE DAILY FOR 3 WEEKS THEN AS NEEDED, Disp: , Rfl:   •  estradiol (MINIVELLE, VIVELLE-DOT) 0.05 MG/24HR patch, Place 1 patch on the skin as directed by provider 2 (Two) Times a Week., Disp: , Rfl:   •  GLUCOSAMINE-CHONDROITIN PO, Take  by mouth Daily., Disp: , Rfl:   •  Multiple Vitamin (MULTI VITAMIN PO), Take  by mouth Daily., Disp: , Rfl:   •  omeprazole (priLOSEC) 40 MG capsule, Take 40 mg by mouth Daily., Disp: , Rfl:   •  pimecrolimus (ELIDEL) 1 % cream, APPLY TO AFFECTED AREA OF ARM TWICE DAILY AS NEEDED FOR MAINTENANCE, Disp: , Rfl:   •  spironolactone (ALDACTONE) 25 MG tablet, Take 25 mg by mouth Daily., Disp: , Rfl:   •  Tirzepatide (Mounjaro) 2.5 MG/0.5ML solution pen-injector, Inject 2.5 mg under the skin into the appropriate area as directed 1 (One) Time Per Week., Disp: 6 mL, Rfl: 2  •  traZODone (DESYREL) 50 MG tablet, TAKE 1 TABLET BY MOUTH EVERYDAY AT BEDTIME, Disp: 90 tablet, Rfl: 2  •  vitamin C (ASCORBIC ACID) 250 MG tablet, Take 250 mg by mouth Daily., Disp: , Rfl:     Allergies: No Known Allergies     The following portions of the patient's history were reviewed by me and updated as appropriate: review of systems, allergies, current medications, past family history, past medical history, past social history, past surgical history and problem list.    There were no vitals filed for this visit.    PHYSICAL EXAM:    CONSTITUTIONAL:  today's vital signs reviewed by me  GASTROINTESTINAL: abdomen is soft nontender nondistended with normal active bowel sounds, no masses are appreciated    Assessment/ Plan  Dysphagia    egd    Risks and benefits as well as alternatives to endoscopic evaluation were explained to the patient and they voiced understanding and wish to proceed.  These risks include but are not limited to the risk of bleeding, perforation, adverse reaction to sedation, and missed lesions.  The  patient was given the opportunity to ask questions prior to the endoscopic procedure.

## 2022-10-25 DIAGNOSIS — R13.19 ESOPHAGEAL DYSPHAGIA: Primary | ICD-10-CM

## 2022-10-25 DIAGNOSIS — R19.8 ABNORMAL FINDINGS ON ESOPHAGOGASTRODUODENOSCOPY (EGD): ICD-10-CM

## 2022-11-03 DIAGNOSIS — R19.8 ABNORMAL FINDINGS ON ESOPHAGOGASTRODUODENOSCOPY (EGD): ICD-10-CM

## 2022-11-03 DIAGNOSIS — R13.19 ESOPHAGEAL DYSPHAGIA: ICD-10-CM

## 2022-11-04 ENCOUNTER — OFFICE VISIT (OUTPATIENT)
Dept: INTERNAL MEDICINE | Facility: CLINIC | Age: 52
End: 2022-11-04

## 2022-11-04 VITALS
HEIGHT: 64 IN | HEART RATE: 84 BPM | SYSTOLIC BLOOD PRESSURE: 128 MMHG | TEMPERATURE: 98.4 F | BODY MASS INDEX: 28 KG/M2 | OXYGEN SATURATION: 98 % | DIASTOLIC BLOOD PRESSURE: 68 MMHG | RESPIRATION RATE: 18 BRPM | WEIGHT: 164 LBS

## 2022-11-04 DIAGNOSIS — E66.09 OBESITY DUE TO EXCESS CALORIES WITHOUT SERIOUS COMORBIDITY, UNSPECIFIED CLASSIFICATION: ICD-10-CM

## 2022-11-04 DIAGNOSIS — E03.8 SUBCLINICAL HYPOTHYROIDISM: Primary | ICD-10-CM

## 2022-11-04 PROCEDURE — 99214 OFFICE O/P EST MOD 30 MIN: CPT | Performed by: INTERNAL MEDICINE

## 2022-11-04 RX ORDER — TIRZEPATIDE 5 MG/.5ML
5 INJECTION, SOLUTION SUBCUTANEOUS WEEKLY
Qty: 2 ML | Refills: 1 | Status: SHIPPED | OUTPATIENT
Start: 2022-11-04 | End: 2022-12-29

## 2022-11-04 NOTE — PROGRESS NOTES
"Chief Complaint  Thyroid Problem (Follow up /Discuss labs )    Subjective        Tushar Martinez presents to [facility]  History of Present Illness  Follow up labs, recent TSH at 5.1, down from 5.7, does have weight gain and issues with weight loss, has had some issues with reflux, swallowing, gastric emptying; follows with KANU Harris, doing well with this, no side effects      Objective   Vital Signs:  /68   Pulse 84   Temp 98.4 °F (36.9 °C)   Resp 18   Ht 162.6 cm (64\")   Wt 74.4 kg (164 lb)   SpO2 98%   BMI 28.15 kg/m²   Estimated body mass index is 28.15 kg/m² as calculated from the following:    Height as of this encounter: 162.6 cm (64\").    Weight as of this encounter: 74.4 kg (164 lb).          Physical Exam  Vitals and nursing note reviewed.   Constitutional:       Appearance: Normal appearance.   HENT:      Head: Normocephalic and atraumatic.      Right Ear: Tympanic membrane, ear canal and external ear normal.      Left Ear: Tympanic membrane, ear canal and external ear normal.      Nose: Nose normal.      Mouth/Throat:      Mouth: Mucous membranes are moist.      Pharynx: Oropharynx is clear.   Eyes:      Extraocular Movements: Extraocular movements intact.      Conjunctiva/sclera: Conjunctivae normal.      Pupils: Pupils are equal, round, and reactive to light.   Cardiovascular:      Rate and Rhythm: Normal rate and regular rhythm.      Pulses: Normal pulses.      Heart sounds: Normal heart sounds.   Pulmonary:      Effort: Pulmonary effort is normal.      Breath sounds: Normal breath sounds. No wheezing, rhonchi or rales.   Abdominal:      General: Abdomen is flat. There is no distension.      Palpations: Abdomen is soft.      Tenderness: There is no abdominal tenderness.   Musculoskeletal:         General: Normal range of motion.      Cervical back: Normal range of motion and neck supple.      Right lower leg: No edema.      Left lower " leg: No edema.   Skin:     General: Skin is warm and dry.      Capillary Refill: Capillary refill takes less than 2 seconds.      Findings: No rash.   Neurological:      General: No focal deficit present.      Mental Status: She is alert and oriented to person, place, and time. Mental status is at baseline.   Psychiatric:         Mood and Affect: Mood normal.         Behavior: Behavior normal.      Body mass index is 28.15 kg/m².    Result Review :                Assessment and Plan   Diagnoses and all orders for this visit:    1. Subclinical hypothyroidism (Primary)  -     US Thyroid  -     Thyroid Peroxidase Antibody    2. Obesity due to excess calories without serious comorbidity, unspecified classification    Other orders  -     Tirzepatide (Mounjaro) 5 MG/0.5ML solution pen-injector; Inject 0.5 mL under the skin into the appropriate area as directed 1 (One) Time Per Week.  Dispense: 2 mL; Refill: 1    - check tpo Abs; check thyroid us; consider starting replacement given GI symptoms weight gain in setting of borderline TSH  - increase mounjaro to 5mg  - discussed risks/benefits/alternatives of meds/treatments  - rtc 6 weeks         Follow Up   No follow-ups on file.  Patient was given instructions and counseling regarding her condition or for health maintenance advice. Please see specific information pulled into the AVS if appropriate.

## 2022-11-05 LAB — THYROPEROXIDASE AB SERPL-ACNC: <8 IU/ML (ref 0–34)

## 2022-11-11 ENCOUNTER — OFFICE VISIT (OUTPATIENT)
Dept: GASTROENTEROLOGY | Facility: CLINIC | Age: 52
End: 2022-11-11

## 2022-11-11 ENCOUNTER — HOSPITAL ENCOUNTER (OUTPATIENT)
Dept: ULTRASOUND IMAGING | Facility: HOSPITAL | Age: 52
Discharge: HOME | End: 2022-11-11
Admitting: INTERNAL MEDICINE

## 2022-11-11 VITALS
HEART RATE: 72 BPM | OXYGEN SATURATION: 100 % | DIASTOLIC BLOOD PRESSURE: 70 MMHG | SYSTOLIC BLOOD PRESSURE: 122 MMHG | TEMPERATURE: 97.7 F

## 2022-11-11 DIAGNOSIS — K31.89 RETAINED FOOD IN STOMACH: ICD-10-CM

## 2022-11-11 DIAGNOSIS — R13.19 ESOPHAGEAL DYSPHAGIA: Primary | ICD-10-CM

## 2022-11-11 PROCEDURE — 99214 OFFICE O/P EST MOD 30 MIN: CPT | Performed by: NURSE PRACTITIONER

## 2022-11-11 PROCEDURE — 76536 US EXAM OF HEAD AND NECK: CPT

## 2022-11-11 RX ORDER — ESTRADIOL 0.05 MG/D
1 FILM, EXTENDED RELEASE TRANSDERMAL WEEKLY
COMMUNITY

## 2022-11-11 NOTE — PROGRESS NOTES
Chief Complaint   Patient presents with   • Difficulty Swallowing         History of Present Illness  Patient is a 52-year-old female who presents today for follow-up.  She was last seen in the office October 2022.  She has been experiencing worsening dysphagia.  She underwent EGD which showed showed food residue in the stomach.  Esophageal manometry was then performed that showed low normal lower esophageal sphincter pressure but was otherwise normal.  Good esophageal strength, peristalsis, and contraction strength was noted.    Patient presents today for follow-up.  She reports she has had continued issues with dysphagia.  This generally occurs around twice per day and there are no particular dietary triggers.  She reports at times she feels as though the food gets stuck in transition when she swallows.  She localizes the sensation primarily to the base of her throat and her mid throat.    She is being evaluated for thyroid problem with plans for a thyroid ultrasound.     Result Review : { Labs  Result Review  Imaging  Med Tab  Media :23}      Manometry (esophageal) (11/03/2022 13:10)   UPPER GI ENDOSCOPY (10/24/2022 09:06)   Office Visit with Diana Ley APRN (10/21/2022)   SCANNED - COLONOSCOPY (11/02/2017)   Office Visit with Diana Ley APRN (07/09/2021)   Tissue Pathology Exam (06/18/2021 12:24)   FL Esophagram Complete Double-Contrast (06/29/2021 08:03)   Tissue Pathology Exam (10/24/2022 09:21)      Vital Signs:   /70   Pulse 72   Temp 97.7 °F (36.5 °C)   SpO2 100%     There is no height or weight on file to calculate BMI.     Physical Exam  Vitals reviewed.   Constitutional:       General: She is not in acute distress.     Appearance: She is well-developed.   HENT:      Head: Normocephalic and atraumatic.   Pulmonary:      Effort: Pulmonary effort is normal. No respiratory distress.   Skin:     General: Skin is dry.      Coloration: Skin is not pale.   Neurological:      Mental  Status: She is alert and oriented to person, place, and time.   Psychiatric:         Thought Content: Thought content normal.           Assessment and Plan    Diagnoses and all orders for this visit:    1. Esophageal dysphagia (Primary)  -     FL Video Swallow With Speech Single Contrast; Future    2. Retained food in stomach         Discussion  Patient presents today for follow-up with concerns about persistent dysphagia.  EGD with normal-appearing esophagus though she did have evidence of retained food.  Recommend proceeding with gastric emptying study to evaluate for gastroparesis.  Esophageal manometry performed with no significant motility disorder noted.  Recommended video swallow study with speech therapy for further evaluation of dysphagia.  Agree with proceeding with thyroid ultrasound to ensure this is not contributing to her symptoms.          Follow Up   Return for Follow up to review results after testing complete.    Patient Instructions   Schedule video swallow study with speech therapy.    Proceed with gastric emptying study as planned for further evaluation.

## 2022-11-11 NOTE — PATIENT INSTRUCTIONS
Schedule video swallow study with speech therapy.    Proceed with gastric emptying study as planned for further evaluation.

## 2022-11-23 ENCOUNTER — HOSPITAL ENCOUNTER (OUTPATIENT)
Dept: GENERAL RADIOLOGY | Facility: HOSPITAL | Age: 52
Discharge: HOME | End: 2022-11-23
Admitting: NURSE PRACTITIONER

## 2022-11-23 DIAGNOSIS — R13.19 ESOPHAGEAL DYSPHAGIA: ICD-10-CM

## 2022-11-23 PROCEDURE — 74230 X-RAY XM SWLNG FUNCJ C+: CPT

## 2022-11-23 PROCEDURE — 92611 MOTION FLUOROSCOPY/SWALLOW: CPT | Performed by: SPEECH-LANGUAGE PATHOLOGIST

## 2022-11-23 RX ADMIN — BARIUM SULFATE 4 ML: 980 POWDER, FOR SUSPENSION ORAL at 09:50

## 2022-11-23 RX ADMIN — BARIUM SULFATE 55 ML: 0.81 POWDER, FOR SUSPENSION ORAL at 09:50

## 2022-11-23 RX ADMIN — BARIUM SULFATE 1 TEASPOON(S): 0.6 CREAM ORAL at 09:50

## 2022-11-23 RX ADMIN — BARIUM SULFATE 50 ML: 400 SUSPENSION ORAL at 09:50

## 2022-11-23 NOTE — MBS/VFSS/FEES
Outpatient Speech Language Pathology   Adult Swallow Initial Evaluation  [facility]     Patient Name: Tushar Martinez  : 1970  MRN: [id number]  Today's Date: 2022         Visit Date: 2022   Patient Active Problem List   Diagnosis   • Esophageal dysphagia        Past Medical History:   Diagnosis Date   • Acute sinusitis, unspecified    • Breast nodule     L   • Cellulitis, unspecified    • De Quervain's fracture of left wrist    • H/O total hysterectomy    • Hyperlipidemia     UNSPECIFIED   • Insomnia, unspecified    • Obesity, unspecified    • Pain in unspecified joint    • Raynaud's syndrome without gangrene         Past Surgical History:   Procedure Laterality Date   • COLONOSCOPY     • ELBOW ARTHROSCOPY Left    • ENDOSCOPY  2017   • ENDOSCOPY N/A 2021    Procedure: ESOPHAGOGASTRODUODENOSCOPY;  Surgeon: Rene Lund MD;  Location: [facility] MAIN OR;  Service: Gastroenterology;  Laterality: N/A;  inlet patch, normal esophagus   • ENDOSCOPY N/A 10/24/2022    Procedure: ESOPHAGOGASTRODUODENOSCOPY;  Surgeon: Rene Lund MD;  Location: [facility] MAIN OR;  Service: Gastroenterology;  Laterality: N/A;  gastric polyp, retained food in stomach   • HYSTERECTOMY     • SHOULDER ARTHROSCOPY Right    • TUBAL ABDOMINAL LIGATION     • UPPER GASTROINTESTINAL ENDOSCOPY     • WRIST ARTHROSCOPY Left          Visit Dx:     ICD-10-CM ICD-9-CM   1. Esophageal dysphagia  R13.19 787.29            OP SLP Assessment/Plan - 22 1033        SLP Assessment    Functional Problems Swallowing  -KA    Clinical Impression: Swallowing WNL;esophageal dysphagia  -KA    Please refer to paper survey for additional self-reported information Yes  -KA    Please refer to items scanned into chart for additional diagnostic informaiton and handouts as provided by clinician Yes  -KA    Prognosis Good (comment)  -KA    Patient/caregiver participated in establishment of treatment plan and goals Yes  -KA    Patient would  benefit from skilled therapy intervention Yes  -KA          User Key  (r) = Recorded By, (t) = Taken By, (c) = Cosigned By    Initials Name Provider Type    Alexander Brito MA,[facility]SLP Speech and Language Pathologist                 SLP Adult Swallow Evaluation     Row Name 11/23/22 1000       Rehab Evaluation    Document Type evaluation  -KA    Subjective Information no complaints  -KA    Patient Observations alert;cooperative  -KA    Patient Effort good  -KA    Symptoms Noted During/After Treatment none  -KA       General Information    Patient Profile Reviewed yes  -KA    Pertinent History Of Current Problem Referred for VFSS due to c/o food feeling stuck in throat during meals. Recent EGD revealed food residue in stomach. Esophageal Manometry revealed no motility disorder. Patient reports she did have esophageal dilation several years ago which helped her symptoms.  -KA    Current Method of Nutrition regular textures;thin liquids  -KA       MBS/VFSS    Utensils Used spoon;cup;straw  -KA    Consistencies Trialed regular textures;soft to chew textures;chopped;mechanical ground textures;pureed;thin liquids;barium pill  -KA       MBS/VFSS Interpretation    VFSS Summary Radiologist Dr Teixeira present: Patient demonstrates a normal oropharyngeal swallow with no penetration/aspiration, adequate hyolaryngeal excursion/elevation and airway protection. Trace vallaculae residue with all solid consistencies, clears with double swallow and or liquid wash. Patient demonstrated prominent CP and suspected cervical osteophytes approximately C6-7. Patient demonstrated trace to mild amount of statis with all solid consistencies (puree, mechanical soft mixed and regular solids) in upper esophagus with trace amount of retrograde movement at times to pyriforms. Statis clears with liquid wash. Several second retention with Barium tablet in upper esophagus  -       SLP Communication to Radiology    Summary Statement Radiologist   Puneet present: Patient demonstrates a normal oropharyngeal swallow with no penetration/aspiration, adequate hyolaryngeal excursion/elevation and airway protection. Trace vallaculae residue with all solid consistencies, clears with double swallow and or liquid wash. Patient demonstrated prominent CP and suspected cervical osteophytes approximately C6-7. Patient demonstrated trace to mild amount of statis with all solid consistencies (puree, mechanical soft mixed and regular solids) in upper esophagus with trace amount of retrograde movement at times to pyriforms. Statis clears with liquid wash.  Several second retention with Barium tablet in upper esophagus  -KA       SLP Evaluation Clinical Impression    SLP Swallowing Diagnosis swallow WFL/no suspected pharyngeal impairment;esophageal dysphagia  -KA    Functional Impact no impact on function  -KA    Swallow Criteria for Skilled Therapeutic Interventions Met no problems identified which require skilled intervention  -KA       SLP Treatment Clinical Impressions    Care Plan Review evaluation/treatment results reviewed  -    Care Plan Review, Other Participant(s) other (see comments)  with patient  -       Recommendations    Therapy Frequency (Swallow) evaluation only  -    SLP Diet Recommendation regular textures;thin liquids  -KA    Recommended Precautions and Strategies upright posture during/after eating;small bites of food and sips of liquid;multiple swallows per bite of food;multiple swallows per sip of liquid;alternate between small bites of food and sips of liquid  -KA    Oral Care Recommendations Oral Care BID/PRN  -KA    SLP Rec. for Method of Medication Administration meds whole;with puree;with thin liquids;as tolerated  -KA    Monitor for Signs of Aspiration yes;notify SLP if any concerns  -KA    Anticipated Discharge Disposition (SLP) home  -KA    Demonstrates Need for Referral to Another Service gastroenterology  -KA          User Key  (r) =  Recorded By, (t) = Taken By, (c) = Cosigned By    Initials Name Provider Type    Alexander Brito MA,CCC-SLP Speech and Language Pathologist                               OP SLP Education     Row Name 11/23/22 1033       Education    Barriers to Learning No barriers identified  -DEBI    Education Provided Described results of evaluation;Patient expressed understanding of evaluation  -DEBI    Assessed Learning needs;Learning motivation  -DEBI    Learning Motivation Strong  -DEBI    Learning Method Explanation  -KA    Teaching Response Verbalized understanding  -KA    Education Comments Reviewed results of VFSS with patient including showing images and discussed safe swallow strategies to reduce pharyngea/esophageal residue, pt voiced understanding.  -KA          User Key  (r) = Recorded By, (t) = Taken By, (c) = Cosigned By    Initials Name Effective Dates    Alexander Brito MA,CCC-SLP 06/02/22 -                          Time Calculation:   SLP Start Time: 0930  SLP Stop Time: 1030  SLP Time Calculation (min): 60 min  Untimed Charges  SLP Eval/Re-eval : ST Motion Fluoro Eval Swallow - 29285  28479-KO Motion Fluoro Eval Swallow Minutes: 60  Total Minutes  Untimed Charges Total Minutes: 60   Total Minutes: 60    Therapy Charges for Today     Code Description Service Date Service Provider Modifiers Qty    75459338411 HC ST MOTION FLUORO EVAL SWALLOW 4 11/23/2022 Alexander Nguyen MA,CCC-SLP GN 1                   Alexander Nguyen MA,CCC-SLP  11/23/2022

## 2022-12-15 ENCOUNTER — OFFICE VISIT (OUTPATIENT)
Dept: INTERNAL MEDICINE | Facility: CLINIC | Age: 52
End: 2022-12-15

## 2022-12-15 VITALS
RESPIRATION RATE: 18 BRPM | OXYGEN SATURATION: 98 % | HEIGHT: 64 IN | HEART RATE: 72 BPM | TEMPERATURE: 98 F | WEIGHT: 158 LBS | SYSTOLIC BLOOD PRESSURE: 130 MMHG | BODY MASS INDEX: 26.98 KG/M2 | DIASTOLIC BLOOD PRESSURE: 72 MMHG

## 2022-12-15 DIAGNOSIS — E03.8 SUBCLINICAL HYPOTHYROIDISM: Primary | ICD-10-CM

## 2022-12-15 PROCEDURE — 99214 OFFICE O/P EST MOD 30 MIN: CPT | Performed by: INTERNAL MEDICINE

## 2022-12-16 LAB
ALBUMIN SERPL-MCNC: 5.1 G/DL (ref 3.5–5.2)
ALBUMIN/GLOB SERPL: 2.7 G/DL
ALP SERPL-CCNC: 71 U/L (ref 39–117)
ALT SERPL-CCNC: 10 U/L (ref 1–33)
AST SERPL-CCNC: 15 U/L (ref 1–32)
BILIRUB SERPL-MCNC: 0.4 MG/DL (ref 0–1.2)
BUN SERPL-MCNC: 16 MG/DL (ref 6–20)
BUN/CREAT SERPL: 25.8 (ref 7–25)
CALCIUM SERPL-MCNC: 9.5 MG/DL (ref 8.6–10.5)
CHLORIDE SERPL-SCNC: 103 MMOL/L (ref 98–107)
CO2 SERPL-SCNC: 26.1 MMOL/L (ref 22–29)
CREAT SERPL-MCNC: 0.62 MG/DL (ref 0.57–1)
EGFRCR SERPLBLD CKD-EPI 2021: 106.6 ML/MIN/1.73
GLOBULIN SER CALC-MCNC: 1.9 GM/DL
GLUCOSE SERPL-MCNC: 75 MG/DL (ref 65–99)
POTASSIUM SERPL-SCNC: 4.4 MMOL/L (ref 3.5–5.2)
PROT SERPL-MCNC: 7 G/DL (ref 6–8.5)
SODIUM SERPL-SCNC: 140 MMOL/L (ref 136–145)
T4 FREE SERPL-MCNC: 1.65 NG/DL (ref 0.93–1.7)
TSH SERPL DL<=0.005 MIU/L-ACNC: 1.32 UIU/ML (ref 0.27–4.2)

## 2022-12-23 ENCOUNTER — PATIENT MESSAGE (OUTPATIENT)
Dept: INTERNAL MEDICINE | Facility: CLINIC | Age: 52
End: 2022-12-23

## 2022-12-27 NOTE — PROGRESS NOTES
"Chief Complaint  Hypothyroidism (Follow up )    Subjective        Tushar Martinez presents to [facility]  History of Present Illness  Here to follow up thyroid, weight loss, doing great, recent TSH elevation      Objective   Vital Signs:  /72   Pulse 72   Temp 98 °F (36.7 °C)   Resp 18   Ht 162.6 cm (64\")   Wt 71.7 kg (158 lb)   SpO2 98%   BMI 27.12 kg/m²   Estimated body mass index is 27.12 kg/m² as calculated from the following:    Height as of this encounter: 162.6 cm (64\").    Weight as of this encounter: 71.7 kg (158 lb).          Physical Exam  Vitals and nursing note reviewed.   Constitutional:       Appearance: Normal appearance.   HENT:      Head: Normocephalic and atraumatic.      Right Ear: Tympanic membrane, ear canal and external ear normal.      Left Ear: Tympanic membrane, ear canal and external ear normal.      Nose: Nose normal.      Mouth/Throat:      Mouth: Mucous membranes are moist.      Pharynx: Oropharynx is clear.   Eyes:      Extraocular Movements: Extraocular movements intact.      Conjunctiva/sclera: Conjunctivae normal.      Pupils: Pupils are equal, round, and reactive to light.   Cardiovascular:      Rate and Rhythm: Normal rate and regular rhythm.      Pulses: Normal pulses.      Heart sounds: Normal heart sounds.   Pulmonary:      Effort: Pulmonary effort is normal.      Breath sounds: Normal breath sounds. No wheezing, rhonchi or rales.   Abdominal:      General: Abdomen is flat. There is no distension.      Palpations: Abdomen is soft.      Tenderness: There is no abdominal tenderness.   Musculoskeletal:         General: Normal range of motion.      Cervical back: Normal range of motion and neck supple.      Right lower leg: No edema.      Left lower leg: No edema.   Skin:     General: Skin is warm and dry.      Capillary Refill: Capillary refill takes less than 2 seconds.      Findings: No rash.   Neurological:      " General: No focal deficit present.      Mental Status: She is alert and oriented to person, place, and time. Mental status is at baseline.   Psychiatric:         Mood and Affect: Mood normal.         Behavior: Behavior normal.        Result Review :                Assessment and Plan   Diagnoses and all orders for this visit:    1. Subclinical hypothyroidism (Primary)  -     TSH  -     T4, Free  -     Comprehensive Metabolic Panel    - check labs as above  - conitnue to adjust mounjaor to goal weight loss  - rtc worsening, change in illness         Follow Up   Return in about 4 weeks (around 1/12/2023).  Patient was given instructions and counseling regarding her condition or for health maintenance advice. Please see specific information pulled into the AVS if appropriate.

## 2022-12-29 ENCOUNTER — TELEPHONE (OUTPATIENT)
Dept: INTERNAL MEDICINE | Facility: CLINIC | Age: 52
End: 2022-12-29
Payer: COMMERCIAL

## 2022-12-29 RX ORDER — TIRZEPATIDE 7.5 MG/.5ML
7.5 INJECTION, SOLUTION SUBCUTANEOUS WEEKLY
Qty: 2 ML | Refills: 2 | Status: SHIPPED | OUTPATIENT
Start: 2022-12-29 | End: 2023-01-24

## 2022-12-29 NOTE — TELEPHONE ENCOUNTER
Pt called her pharmacy to see the status of her medication. She says that they haven't received it. The medication in question is Mounjaro 5mg/0.5ML solution pen. She is wondering when it will be sent in. Please advise the patient when prescription is sent in.     Callback # [phone]

## 2022-12-30 NOTE — TELEPHONE ENCOUNTER
From: Tushar Martinez  To: Iam Ramirez  Sent: 12/23/2022 4:11 PM EST  Subject: Mounjaro prescription    Hey Dr. Ramirez, I just called the pharmacy to make sure they had the updated amount for the prescription that you just pumped up to the next level and they said they don’t have it so when you get back in the office can you call and make sure that it gets sent in I will need to get that refilled next week.   Thanks and have a david Saavedra

## 2023-01-17 ENCOUNTER — HOSPITAL ENCOUNTER (OUTPATIENT)
Dept: NUCLEAR MEDICINE | Facility: HOSPITAL | Age: 53
Discharge: HOME | End: 2023-01-17
Payer: COMMERCIAL

## 2023-01-17 DIAGNOSIS — R13.19 ESOPHAGEAL DYSPHAGIA: ICD-10-CM

## 2023-01-17 DIAGNOSIS — R19.8 ABNORMAL FINDINGS ON ESOPHAGOGASTRODUODENOSCOPY (EGD): ICD-10-CM

## 2023-01-17 PROCEDURE — A9541 TC99M SULFUR COLLOID: HCPCS | Performed by: INTERNAL MEDICINE

## 2023-01-17 PROCEDURE — 78264 GASTRIC EMPTYING IMG STUDY: CPT

## 2023-01-17 PROCEDURE — 0 TECHNETIUM SULFUR COLLOID: Performed by: INTERNAL MEDICINE

## 2023-01-17 RX ADMIN — TECHNETIUM TC 99M SULFUR COLLOID 1 DOSE: KIT at 08:03

## 2023-01-24 ENCOUNTER — OFFICE VISIT (OUTPATIENT)
Dept: INTERNAL MEDICINE | Facility: CLINIC | Age: 53
End: 2023-01-24
Payer: COMMERCIAL

## 2023-01-24 VITALS
OXYGEN SATURATION: 99 % | DIASTOLIC BLOOD PRESSURE: 70 MMHG | RESPIRATION RATE: 18 BRPM | SYSTOLIC BLOOD PRESSURE: 128 MMHG | TEMPERATURE: 98.1 F | HEIGHT: 64 IN | BODY MASS INDEX: 24.24 KG/M2 | WEIGHT: 142 LBS | HEART RATE: 102 BPM

## 2023-01-24 DIAGNOSIS — E03.8 SUBCLINICAL HYPOTHYROIDISM: ICD-10-CM

## 2023-01-24 DIAGNOSIS — E66.09 OBESITY DUE TO EXCESS CALORIES WITHOUT SERIOUS COMORBIDITY, UNSPECIFIED CLASSIFICATION: Primary | ICD-10-CM

## 2023-01-24 PROCEDURE — 99213 OFFICE O/P EST LOW 20 MIN: CPT | Performed by: INTERNAL MEDICINE

## 2023-01-24 RX ORDER — TIRZEPATIDE 10 MG/.5ML
10 INJECTION, SOLUTION SUBCUTANEOUS WEEKLY
Qty: 2 ML | Refills: 1 | Status: SHIPPED | OUTPATIENT
Start: 2023-01-24 | End: 2023-03-30

## 2023-01-24 NOTE — PROGRESS NOTES
"Chief Complaint  Weight Check (Medication follow up )    Subjective        Tushar Martinez presents to [facility]  History of Present Illness  Here to follow up weight loss; doing great, over last 2 months has lost 10kg; working on limiting calories, carbs; doing well with mounjaro      Objective   Vital Signs:  /70   Pulse 102   Temp 98.1 °F (36.7 °C)   Resp 18   Ht 162.6 cm (64\")   Wt 64.4 kg (142 lb)   SpO2 99%   BMI 24.37 kg/m²   Estimated body mass index is 24.37 kg/m² as calculated from the following:    Height as of this encounter: 162.6 cm (64\").    Weight as of this encounter: 64.4 kg (142 lb).       BMI is within normal parameters. No other follow-up for BMI required.      Physical Exam  Vitals and nursing note reviewed.   Constitutional:       Appearance: Normal appearance.   HENT:      Head: Normocephalic and atraumatic.      Right Ear: External ear normal.      Left Ear: External ear normal.      Nose: Nose normal.      Mouth/Throat:      Mouth: Mucous membranes are moist.      Pharynx: Oropharynx is clear.   Eyes:      Extraocular Movements: Extraocular movements intact.      Conjunctiva/sclera: Conjunctivae normal.   Pulmonary:      Effort: Pulmonary effort is normal. No respiratory distress.   Musculoskeletal:         General: Normal range of motion.      Cervical back: Normal range of motion.   Skin:     Findings: No rash.   Neurological:      General: No focal deficit present.      Mental Status: She is alert. Mental status is at baseline.   Psychiatric:         Mood and Affect: Mood normal.         Behavior: Behavior normal.         Thought Content: Thought content normal.         Judgment: Judgment normal.        Result Review :                   Assessment and Plan   Diagnoses and all orders for this visit:    1. Obesity due to excess calories without serious comorbidity, unspecified classification (Primary)    2. Subclinical " hypothyroidism    Other orders  -     Tirzepatide (Mounjaro) 10 MG/0.5ML solution pen-injector; Inject 10 mg under the skin into the appropriate area as directed 1 (One) Time Per Week.  Dispense: 2 mL; Refill: 1    - increase mounjaor to 10mg, doing great without issues, tolerating well; has had significant weight loss and doing great  - rtc 1 month         Follow Up   Return in about 4 weeks (around 2/21/2023) for Recheck.  Patient was given instructions and counseling regarding her condition or for health maintenance advice. Please see specific information pulled into the AVS if appropriate.

## 2023-01-26 ENCOUNTER — OFFICE VISIT (OUTPATIENT)
Dept: GASTROENTEROLOGY | Facility: CLINIC | Age: 53
End: 2023-01-26
Payer: COMMERCIAL

## 2023-01-26 VITALS
HEIGHT: 64 IN | DIASTOLIC BLOOD PRESSURE: 68 MMHG | TEMPERATURE: 97.8 F | SYSTOLIC BLOOD PRESSURE: 124 MMHG | HEART RATE: 80 BPM | OXYGEN SATURATION: 100 % | WEIGHT: 142.9 LBS | BODY MASS INDEX: 24.4 KG/M2

## 2023-01-26 DIAGNOSIS — K31.84 GASTROPARESIS: Chronic | ICD-10-CM

## 2023-01-26 DIAGNOSIS — R13.12 OROPHARYNGEAL DYSPHAGIA: Primary | Chronic | ICD-10-CM

## 2023-01-26 PROCEDURE — 99214 OFFICE O/P EST MOD 30 MIN: CPT | Performed by: NURSE PRACTITIONER

## 2023-01-26 NOTE — PROGRESS NOTES
Chief Complaint   Patient presents with   • Follow-up     Dysphagia         History of Present Illness  52-year-old female presents the office today for follow-up.  She was last seen in office on 11/11/2022.  She has a history of dysphagia.  EGD revealed residue in the stomach.  Esophageal manometry showed some low to normal LES pressure, but was otherwise unremarkable.  Good strength in the esophagus was noted, good peristalsis and contraction strength were noted. Esophagram on 6/29/2023 revealed mild tertiary wave formation with subtle intraesophageal reflux. VSS 11/23/2022 did reveal some residue in the upper esophagus that eventually passed with water wash.     She continues to have dysphagia on a daily basis. Food sticks in the upper portion of her esophagus, particularly breads. Some foods are worse than others but she can experience symptoms with any foods. She does not experience dysphagia to liquids.     She had an esophageal dilation in the past which helped, but during her last EGD she had food residue in her stomach and was not able to undergo a dilation. Symptoms of dysphagia prior to her last dilation were located in the same area of her esophagus as they are now. At times she will have pill dysphagia and will have to regurgitate the pill. She typically will not have regurgitation of food into her esophagus. She does not take PPI therapy as it was not beneficial. She has not yet tried Levsin.     GES January 2023 revealed significant delay with 62% residual at 4 hours. She reports early satiety, denies any nausea or vomiting.     Last colonoscopy was performed on 11/2/2017 revealing nonbleeding internal hemorrhoids, otherwise was unremarkable.  Review of Systems   HENT: Positive for trouble swallowing.       Result Review :       Office Visit with Diana Ley APRN (11/11/2022)  UPPER GI ENDOSCOPY (10/24/2022 09:06)  Manometry (esophageal) (11/03/2022 13:10)  Tissue Pathology Exam (10/24/2022  "09:21)  SCANNED - COLONOSCOPY (11/02/2017)  NM Gastric Emptying (01/17/2023 12:06)  FL Esophagram Complete Double-Contrast (06/29/2021 08:03)  FL Video Swallow With Speech Single Contrast (11/23/2022 09:51)      Vital Signs:   /68   Pulse 80   Temp 97.8 °F (36.6 °C)   Ht 162.6 cm (64\")   Wt 64.8 kg (142 lb 14.4 oz)   SpO2 100%   BMI 24.53 kg/m²     Body mass index is 24.53 kg/m².     Physical Exam  Vitals reviewed.   Constitutional:       Appearance: Normal appearance.   Pulmonary:      Effort: Pulmonary effort is normal. No respiratory distress.   Abdominal:      General: Abdomen is flat. Bowel sounds are normal. There is no distension.      Palpations: Abdomen is soft. There is no mass.      Tenderness: There is no abdominal tenderness. There is no guarding.   Musculoskeletal:         General: Normal range of motion.   Skin:     General: Skin is warm and dry.   Neurological:      General: No focal deficit present.      Mental Status: She is alert and oriented to person, place, and time.   Psychiatric:         Mood and Affect: Mood normal.         Behavior: Behavior normal.         Thought Content: Thought content normal.         Judgment: Judgment normal.       Assessment and Plan    Diagnoses and all orders for this visit:    1. Oropharyngeal dysphagia (Primary)    2. Gastroparesis    Other orders  -     hyoscyamine (LEVSIN) 0.125 MG SL tablet; Place 1 tab under the tongue up to 4 x daily as needed for esophageal spasms  Dispense: 120 tablet; Refill: 2             Patient Instructions   1.  For further evaluation of dysphagia we will proceed with esophagram, as the location of your dysphagia is different from previous symptoms.    2.  In regards to your delayed gastric emptying, we recommend eating smaller more frequent meals and avoiding foods that are high in fat and fiber for now.  We will first address her issues with difficulty swallowing first.    3.  We will plan for office follow-up in 8 weeks " for reassessment of symptoms, or sooner should symptoms worsen or fail to improve.     Discussion:    Patient has had recurrent dysphagia, although the location of her dysphagia is now more oropharyngeal rather than further down her esophagus.  She has undergone a video swallowing study, previous esophagram, and esophageal manometry testing.  Manometry testing only revealed mild tertiary wave formation with subtle intraesophageal reflux.  Swallowing precautions were reviewed with patient.  Patient may benefit from repeat dilation, although I feel that it is important for us to repeat her esophagram at this time.    We have also sent in a prescription for Levsin.  In the event that the patient may be having some esophageal spasms and dysmotility, we will see if Levsin helps relax the esophagus and better improve her dysphagia.  We will plan for follow-up visit in 8 weeks for reassessment of symptoms, or sooner should symptoms worsen or fail to improve.  APRN to discuss patient's treatment plan of care with Dr. Lund moving forward.      Patient was noted to have food residue in her stomach on most recent EGD and therefore, esophageal dilation was not performed.  Follow-up gastric emptying study as noted above with significant delay at 4 hours.  At this time we will defer any medication management as patient is not really having gastroparesis type symptoms.  Could consider use of erythromycin in the future.  Patient was advised to continue to eat smaller more frequent meals and avoid foods high in fat and fiber.  Patient verbalized understanding of above plan of care and is in agreement.  All questions answered and support provided.       EMR Dragon/Transcription Disclaimer:  This document has been Dictated utilizing Dragon dictation.

## 2023-02-14 DIAGNOSIS — R13.12 OROPHARYNGEAL DYSPHAGIA: Primary | ICD-10-CM

## 2023-02-14 DIAGNOSIS — R13.10 DYSPHAGIA, UNSPECIFIED TYPE: ICD-10-CM

## 2023-02-17 ENCOUNTER — OFFICE VISIT (OUTPATIENT)
Dept: INTERNAL MEDICINE | Facility: CLINIC | Age: 53
End: 2023-02-17
Payer: COMMERCIAL

## 2023-02-17 VITALS
WEIGHT: 145 LBS | HEIGHT: 64 IN | RESPIRATION RATE: 18 BRPM | BODY MASS INDEX: 24.75 KG/M2 | TEMPERATURE: 97.8 F | SYSTOLIC BLOOD PRESSURE: 112 MMHG | OXYGEN SATURATION: 100 % | DIASTOLIC BLOOD PRESSURE: 68 MMHG | HEART RATE: 63 BPM

## 2023-02-17 DIAGNOSIS — E03.8 SUBCLINICAL HYPOTHYROIDISM: ICD-10-CM

## 2023-02-17 DIAGNOSIS — E66.09 OBESITY DUE TO EXCESS CALORIES WITHOUT SERIOUS COMORBIDITY, UNSPECIFIED CLASSIFICATION: Primary | ICD-10-CM

## 2023-02-17 PROCEDURE — 99213 OFFICE O/P EST LOW 20 MIN: CPT | Performed by: INTERNAL MEDICINE

## 2023-02-17 NOTE — PROGRESS NOTES
"Chief Complaint  Hypothyroidism (Follow up )    Subjective        Tushar Martinez presents to [facility]  History of Present Illness  Hypothyroidism, doing well, having good weight loss and numbers are better adjusted with her dose after the significant weight loss    Obesity, doing excellent with glp1 meds, no side effects      Objective   Vital Signs:  /68   Pulse 63   Temp 97.8 °F (36.6 °C)   Resp 18   Ht 162.6 cm (64\")   Wt 65.8 kg (145 lb)   SpO2 100%   BMI 24.89 kg/m²   Estimated body mass index is 24.89 kg/m² as calculated from the following:    Height as of this encounter: 162.6 cm (64\").    Weight as of this encounter: 65.8 kg (145 lb).       BMI is within normal parameters. No other follow-up for BMI required.      Physical Exam  Vitals and nursing note reviewed.   Constitutional:       Appearance: Normal appearance.   HENT:      Head: Normocephalic and atraumatic.      Right Ear: Tympanic membrane, ear canal and external ear normal.      Left Ear: Tympanic membrane, ear canal and external ear normal.      Nose: Nose normal.      Mouth/Throat:      Mouth: Mucous membranes are moist.      Pharynx: Oropharynx is clear.   Eyes:      Extraocular Movements: Extraocular movements intact.      Conjunctiva/sclera: Conjunctivae normal.      Pupils: Pupils are equal, round, and reactive to light.   Cardiovascular:      Rate and Rhythm: Normal rate and regular rhythm.      Pulses: Normal pulses.      Heart sounds: Normal heart sounds.   Pulmonary:      Effort: Pulmonary effort is normal.      Breath sounds: Normal breath sounds. No wheezing, rhonchi or rales.   Abdominal:      General: Abdomen is flat. There is no distension.      Palpations: Abdomen is soft.      Tenderness: There is no abdominal tenderness.   Musculoskeletal:         General: Normal range of motion.      Cervical back: Normal range of motion and neck supple.      Right lower leg: No edema. "      Left lower leg: No edema.   Skin:     General: Skin is warm and dry.      Capillary Refill: Capillary refill takes less than 2 seconds.      Findings: No rash.   Neurological:      General: No focal deficit present.      Mental Status: She is alert and oriented to person, place, and time. Mental status is at baseline.   Psychiatric:         Mood and Affect: Mood normal.         Behavior: Behavior normal.        Result Review :                   Assessment and Plan   Diagnoses and all orders for this visit:    1. Obesity due to excess calories without serious comorbidity, unspecified classification (Primary)    2. Subclinical hypothyroidism    - continue mounjaro, she is calling pharmacy to seek out the 10mg dose; if unavailable ok with changing to the 12 mg dosing, discussed risks/benefits, nausea possibilities with this increase; she is also purssuing more cost effective solutions  - conitnue diet, exercise, doing great         Follow Up   No follow-ups on file.  Patient was given instructions and counseling regarding her condition or for health maintenance advice. Please see specific information pulled into the AVS if appropriate.

## 2023-03-22 ENCOUNTER — TELEPHONE (OUTPATIENT)
Dept: INTERNAL MEDICINE | Facility: CLINIC | Age: 53
End: 2023-03-22
Payer: COMMERCIAL

## 2023-03-22 ENCOUNTER — OFFICE VISIT (OUTPATIENT)
Dept: GASTROENTEROLOGY | Facility: CLINIC | Age: 53
End: 2023-03-22
Payer: COMMERCIAL

## 2023-03-22 DIAGNOSIS — K59.00 CONSTIPATION, UNSPECIFIED CONSTIPATION TYPE: Chronic | ICD-10-CM

## 2023-03-22 DIAGNOSIS — R13.10 DYSPHAGIA, UNSPECIFIED TYPE: Primary | Chronic | ICD-10-CM

## 2023-03-22 DIAGNOSIS — K31.84 GASTROPARESIS: Chronic | ICD-10-CM

## 2023-03-22 DIAGNOSIS — R11.0 NAUSEA: Chronic | ICD-10-CM

## 2023-03-22 PROCEDURE — 99214 OFFICE O/P EST MOD 30 MIN: CPT | Performed by: NURSE PRACTITIONER

## 2023-03-22 NOTE — PROGRESS NOTES
Chief Complaint   Patient presents with   • Difficulty Swallowing   • Constipation         History of Present Illness  52-year-old female presents today for telephone follow-up.  She was last seen in office on 1/26/2023.  She has a history of dysphagia.  Her esophageal manometry revealed low to normal LES pressure, otherwise was unremarkable.  She was noted to have good strength in the esophagus and good peristalsis and contraction strength.  Esophagram 6/29/2022 demonstrated mild tertiary wave formation with subtle intraesophageal reflux.  Video swallow study on 11/23/2022 revealed some residue in the upper esophagus that eventually passed following a water wash.    Her last office visit she was continuing to report esophageal dysphagia with food sticking in the upper portion of her esophagus.  This esophageal dilations have been helpful.  Repeat esophagram was ordered, but not yet completed.  GES January 2023 revealed significant delay with 62% residual at 4 hours. She reports occasional nausea, but it resolves. She denies any emesis.     She has since changed jobs and was pushing out her imaging a bit. She reports that hyoscyamine is somewhat helpful. She does not feel that her throat is as tight as before. Food still will get stuck, but better than what it was before use of the medication. She reports that the medication was expensive and she had it filled with 30 tablets. She does not take the medication daily. She has noticed that if she takes a tablet at lunchtime she will have an easier time with her dinner and will not have as much difficulty with swallowing. She is not having any regurgitation. Her throat seems to be more sensitive.     She denies any change in bowel habits. She reports having intermittent constipation. She reports having a BM every 2 days. BMs are usually of normal consistency. However, about every 5th stool will require straining and stool can be hard. She has used MiraLax in the past  which provided her with more regular BMs.     You have chosen to receive care through a telephone visit.   Do you consent to use a telephone visit for your medical care today? Yes    Review of Systems   Constitutional: Negative for fever and unexpected weight change.   HENT: Positive for trouble swallowing.    Cardiovascular: Negative for chest pain.   Gastrointestinal: Positive for constipation and nausea. Negative for abdominal distention, abdominal pain, anal bleeding, blood in stool, diarrhea, rectal pain and vomiting.      Result Review :      Office Visit with Rocío Thornton APRN (01/26/2023)  FL Video Swallow With Speech Single Contrast (11/23/2022 09:51)    Assessment and Plan    Diagnoses and all orders for this visit:    1. Dysphagia, unspecified type (Primary)    2. Nausea    3. Constipation, unspecified constipation type    4. Gastroparesis         This visit has been rescheduled as a phone visit to comply with patient safety concerns in accordance with CDC recommendations. Total time of discussion was 13 minutes.    Patient Instructions   1.  For gastroparesis recommend eating smaller more frequent meals.  We also recommend avoiding raw vegetables, cooked vegetables are fine.  We also recommend avoiding peels of foods.    Additionally for gastroparesis, we recommend use of gingerroot capsules.  These are available over-the-counter at your local grocery or pharmacy and either 500 mg or 550 mg formulation.  You may take 1 to 2 capsules up to 3 times daily before meals, maximum dose is 6 capsules/day.    2.  For further evaluation of dysphagia, we recommend you proceed with esophagram once your insurance allows.    3.  For constipation we recommend daily use of MiraLAX, starting with 1 capful daily.  You may titrate your dosing based upon how your bowel habits respond.    4.  Additional recommendations pending outcome of esophagram and response to above regimen.     Discussion:    Patient has not yet  proceeded with esophagram due to insurance cost.  She states that as of May 1, she will have a new insurance plan and plans to have her esophagram performed at that time.  She did report response to Levsin, and will continue to use this on an intermittent basis for symptom management.    For gastroparesis, dietary modifications and gingerroot were recommended.  Although GES appears to be severe, patient's symptoms are not severe.    For constipation we have recommended daily use of MiraLAX with titration of dosing based upon how her bowel habits respond.  Additional recommendations pending outcome of esophagram.  Patient may need to undergo EGD with empiric dilation, as she has responded well to this in the past.  Patient verbalized understand above plan of care and is in agreement.  All questions answered and support provided.    EMR Dragon/Transcription Disclaimer:  This document has been Dictated utilizing Dragon dictation.

## 2023-03-22 NOTE — PATIENT INSTRUCTIONS
1.  For gastroparesis recommend eating smaller more frequent meals.  We also recommend avoiding raw vegetables, cooked vegetables are fine.  We also recommend avoiding peels of foods.    Additionally for gastroparesis, we recommend use of gingerroot capsules.  These are available over-the-counter at your local grocery or pharmacy and either 500 mg or 550 mg formulation.  You may take 1 to 2 capsules up to 3 times daily before meals, maximum dose is 6 capsules/day.    2.  For further evaluation of dysphagia, we recommend you proceed with esophagram once your insurance allows.    3.  For constipation we recommend daily use of MiraLAX, starting with 1 capful daily.  You may titrate your dosing based upon how your bowel habits respond.    4.  Additional recommendations pending outcome of esophagram and response to above regimen.

## 2023-03-29 ENCOUNTER — PATIENT MESSAGE (OUTPATIENT)
Dept: INTERNAL MEDICINE | Facility: CLINIC | Age: 53
End: 2023-03-29
Payer: COMMERCIAL

## 2023-03-30 RX ORDER — TIRZEPATIDE 15 MG/.5ML
15 INJECTION, SOLUTION SUBCUTANEOUS WEEKLY
Qty: 2 ML | Refills: 6 | Status: SHIPPED | OUTPATIENT
Start: 2023-03-30

## 2023-03-30 NOTE — TELEPHONE ENCOUNTER
From: Tushar Martinez  To: Iam Salcido  Sent: 3/29/2023 5:45 PM EDT  Subject: Monjauro On back order    Hey Dr. Salcido just checking in. I’ve been waiting for the Felicia 10 mg to come in but it’s still on backorder. The pharmacist said the 15 mg is available right now would it be possible to bump up to that level? Just let me know what you think. If you think that’s all right you can go ahead and send in the prescription and I can get it filled.

## 2023-04-24 RX ORDER — TRAZODONE HYDROCHLORIDE 50 MG/1
TABLET ORAL
Qty: 90 TABLET | Refills: 2 | Status: SHIPPED | OUTPATIENT
Start: 2023-04-24

## 2023-04-24 NOTE — TELEPHONE ENCOUNTER
Rx Refill Note  Requested Prescriptions     Pending Prescriptions Disp Refills   • traZODone (DESYREL) 50 MG tablet [Pharmacy Med Name: TRAZODONE 50 MG TABLET] 90 tablet 2     Sig: TAKE 1 TABLET BY MOUTH EVERYDAY AT BEDTIME      Last office visit with prescribing clinician: 2/17/2023   Last telemedicine visit with prescribing clinician: Visit date not found   Next office visit with prescribing clinician: Visit date not found                         Would you like a call back once the refill request has been completed: [] Yes [] No    If the office needs to give you a call back, can they leave a voicemail: [] Yes [] No    Raoul Dumont MA  04/24/23, 15:20 EDT

## 2023-07-26 ENCOUNTER — HOSPITAL ENCOUNTER (OUTPATIENT)
Dept: GENERAL RADIOLOGY | Facility: HOSPITAL | Age: 53
Discharge: HOME | End: 2023-07-26
Admitting: NURSE PRACTITIONER
Payer: COMMERCIAL

## 2023-07-26 DIAGNOSIS — R13.10 DYSPHAGIA, UNSPECIFIED TYPE: ICD-10-CM

## 2023-07-26 PROCEDURE — 74221 X-RAY XM ESOPHAGUS 2CNTRST: CPT

## 2023-07-26 RX ADMIN — ANTACID/ANTIFLATULENT 1 PACKET: 380; 550; 10; 10 GRANULE, EFFERVESCENT ORAL at 07:30

## 2023-07-26 RX ADMIN — BARIUM SULFATE 183 ML: 960 POWDER, FOR SUSPENSION ORAL at 07:30

## 2023-07-26 RX ADMIN — BARIUM SULFATE 135 ML: 980 POWDER, FOR SUSPENSION ORAL at 07:30

## 2023-07-26 RX ADMIN — BARIUM SULFATE 700 MG: 700 TABLET ORAL at 07:30

## 2023-07-27 ENCOUNTER — PREP FOR SURGERY (OUTPATIENT)
Dept: SURGERY | Facility: SURGERY CENTER | Age: 53
End: 2023-07-27
Payer: COMMERCIAL

## 2023-07-27 DIAGNOSIS — R13.10 DYSPHAGIA, UNSPECIFIED TYPE: Primary | ICD-10-CM

## 2023-07-27 RX ORDER — SODIUM CHLORIDE 0.9 % (FLUSH) 0.9 %
10 SYRINGE (ML) INJECTION AS NEEDED
OUTPATIENT
Start: 2023-07-27

## 2023-07-27 RX ORDER — SODIUM CHLORIDE, SODIUM LACTATE, POTASSIUM CHLORIDE, CALCIUM CHLORIDE 600; 310; 30; 20 MG/100ML; MG/100ML; MG/100ML; MG/100ML
30 INJECTION, SOLUTION INTRAVENOUS CONTINUOUS PRN
OUTPATIENT
Start: 2023-07-27

## 2023-07-27 RX ORDER — SODIUM CHLORIDE 0.9 % (FLUSH) 0.9 %
3 SYRINGE (ML) INJECTION EVERY 12 HOURS SCHEDULED
OUTPATIENT
Start: 2023-07-27

## 2023-08-11 ENCOUNTER — TELEPHONE (OUTPATIENT)
Dept: GASTROENTEROLOGY | Facility: CLINIC | Age: 53
End: 2023-08-11
Payer: COMMERCIAL

## 2023-08-11 NOTE — TELEPHONE ENCOUNTER
Caller: Tushar Martinez    Relationship to patient: Self    Best call back number:[phone]    Patient complaint PATIENT IS NEEDING TO RESCHEDULE HER EGD ON 08/28/23. PLEASE CALL PATIENT BACK TO CHANGE APPT

## 2023-09-07 ENCOUNTER — HOSPITAL ENCOUNTER (OUTPATIENT)
Facility: SURGERY CENTER | Age: 53
Setting detail: HOSPITAL OUTPATIENT SURGERY
Discharge: HOME | End: 2023-09-07
Attending: INTERNAL MEDICINE | Admitting: INTERNAL MEDICINE
Payer: COMMERCIAL

## 2023-09-07 ENCOUNTER — ANESTHESIA EVENT (OUTPATIENT)
Dept: SURGERY | Facility: SURGERY CENTER | Age: 53
End: 2023-09-07
Payer: COMMERCIAL

## 2023-09-07 ENCOUNTER — ANESTHESIA (OUTPATIENT)
Dept: SURGERY | Facility: SURGERY CENTER | Age: 53
End: 2023-09-07
Payer: COMMERCIAL

## 2023-09-07 VITALS
HEIGHT: 64 IN | TEMPERATURE: 97.1 F | DIASTOLIC BLOOD PRESSURE: 71 MMHG | HEART RATE: 59 BPM | SYSTOLIC BLOOD PRESSURE: 134 MMHG | WEIGHT: 122.4 LBS | BODY MASS INDEX: 20.9 KG/M2 | OXYGEN SATURATION: 100 % | RESPIRATION RATE: 16 BRPM

## 2023-09-07 DIAGNOSIS — R13.10 DYSPHAGIA, UNSPECIFIED TYPE: ICD-10-CM

## 2023-09-07 PROCEDURE — 43248 EGD GUIDE WIRE INSERTION: CPT | Performed by: INTERNAL MEDICINE

## 2023-09-07 PROCEDURE — 25010000002 PROPOFOL 10 MG/ML EMULSION: Performed by: ANESTHESIOLOGY

## 2023-09-07 PROCEDURE — 88305 TISSUE EXAM BY PATHOLOGIST: CPT | Performed by: INTERNAL MEDICINE

## 2023-09-07 PROCEDURE — 0 LIDOCAINE 1 % SOLUTION: Performed by: ANESTHESIOLOGY

## 2023-09-07 PROCEDURE — 43239 EGD BIOPSY SINGLE/MULTIPLE: CPT | Performed by: INTERNAL MEDICINE

## 2023-09-07 RX ORDER — SODIUM CHLORIDE, SODIUM LACTATE, POTASSIUM CHLORIDE, CALCIUM CHLORIDE 600; 310; 30; 20 MG/100ML; MG/100ML; MG/100ML; MG/100ML
30 INJECTION, SOLUTION INTRAVENOUS CONTINUOUS PRN
Status: DISCONTINUED | OUTPATIENT
Start: 2023-09-07 | End: 2023-09-07 | Stop reason: HOSPADM

## 2023-09-07 RX ORDER — SODIUM CHLORIDE 0.9 % (FLUSH) 0.9 %
3 SYRINGE (ML) INJECTION EVERY 12 HOURS SCHEDULED
Status: DISCONTINUED | OUTPATIENT
Start: 2023-09-07 | End: 2023-09-07 | Stop reason: HOSPADM

## 2023-09-07 RX ORDER — MAGNESIUM HYDROXIDE 1200 MG/15ML
LIQUID ORAL AS NEEDED
Status: DISCONTINUED | OUTPATIENT
Start: 2023-09-07 | End: 2023-09-07 | Stop reason: HOSPADM

## 2023-09-07 RX ORDER — PROPOFOL 10 MG/ML
VIAL (ML) INTRAVENOUS AS NEEDED
Status: DISCONTINUED | OUTPATIENT
Start: 2023-09-07 | End: 2023-09-07 | Stop reason: SURG

## 2023-09-07 RX ORDER — SODIUM CHLORIDE 0.9 % (FLUSH) 0.9 %
10 SYRINGE (ML) INJECTION AS NEEDED
Status: DISCONTINUED | OUTPATIENT
Start: 2023-09-07 | End: 2023-09-07 | Stop reason: HOSPADM

## 2023-09-07 RX ORDER — LIDOCAINE HYDROCHLORIDE 10 MG/ML
INJECTION, SOLUTION INFILTRATION; PERINEURAL AS NEEDED
Status: DISCONTINUED | OUTPATIENT
Start: 2023-09-07 | End: 2023-09-07 | Stop reason: SURG

## 2023-09-07 RX ADMIN — PROPOFOL 30 MG: 10 INJECTION, EMULSION INTRAVENOUS at 09:07

## 2023-09-07 RX ADMIN — LIDOCAINE HYDROCHLORIDE 30 MG: 10 INJECTION, SOLUTION INFILTRATION; PERINEURAL at 09:05

## 2023-09-07 RX ADMIN — SODIUM CHLORIDE, POTASSIUM CHLORIDE, SODIUM LACTATE AND CALCIUM CHLORIDE 30 ML/HR: 600; 310; 30; 20 INJECTION, SOLUTION INTRAVENOUS at 07:45

## 2023-09-07 RX ADMIN — PROPOFOL 30 MG: 10 INJECTION, EMULSION INTRAVENOUS at 09:09

## 2023-09-07 RX ADMIN — PROPOFOL 30 MG: 10 INJECTION, EMULSION INTRAVENOUS at 09:11

## 2023-09-07 RX ADMIN — PROPOFOL 100 MG: 10 INJECTION, EMULSION INTRAVENOUS at 09:05

## 2023-09-07 NOTE — ANESTHESIA PREPROCEDURE EVALUATION
Anesthesia Evaluation     Patient summary reviewed and Nursing notes reviewed   NPO Solid Status: > 8 hours  NPO Liquid Status: > 2 hours           Airway   Mallampati: II  TM distance: >3 FB  Neck ROM: full  No difficulty expected  Dental - normal exam     Pulmonary - negative pulmonary ROS and normal exam   Cardiovascular - normal exam  Exercise tolerance: good (4-7 METS)    (+) hyperlipidemia      Neuro/Psych- negative ROS  GI/Hepatic/Renal/Endo - negative ROS     Musculoskeletal (-) negative ROS    Abdominal    Substance History - negative use     OB/GYN negative ob/gyn ROS         Other                      Anesthesia Plan    ASA 2     MAC     intravenous induction     Anesthetic plan, risks, benefits, and alternatives have been provided, discussed and informed consent has been obtained with: patient.    CODE STATUS:

## 2023-09-07 NOTE — H&P
No chief complaint on file.      HPI  dysphagia         Problem List:    Patient Active Problem List   Diagnosis    Esophageal dysphagia       Medical History:    Past Medical History:   Diagnosis Date    Acute sinusitis, unspecified     Breast nodule     L    Cellulitis, unspecified     De Quervain's fracture of left wrist     H/O total hysterectomy     Hyperlipidemia     UNSPECIFIED    Insomnia, unspecified     Obesity, unspecified     Pain in unspecified joint     Raynaud's syndrome without gangrene         Social History:    Social History     Socioeconomic History    Marital status:    Tobacco Use    Smoking status: Never    Smokeless tobacco: Never   Vaping Use    Vaping Use: Never used   Substance and Sexual Activity    Alcohol use: Yes     Alcohol/week: 4.0 standard drinks     Types: 4 Glasses of wine per week     Comment: Occasionally    Drug use: Never    Sexual activity: Yes     Partners: Male     Birth control/protection: Vasectomy, Hysterectomy       Family History:   Family History   Problem Relation Age of Onset    Heart failure Father     Diabetes Father     Colon cancer Neg Hx     Colon polyps Neg Hx     Crohn's disease Neg Hx     Irritable bowel syndrome Neg Hx     Ulcerative colitis Neg Hx        Surgical History:   Past Surgical History:   Procedure Laterality Date    COLONOSCOPY      ELBOW ARTHROSCOPY Left     ENDOSCOPY  2017    ENDOSCOPY N/A 06/18/2021    Procedure: ESOPHAGOGASTRODUODENOSCOPY;  Surgeon: Rene Lund MD;  [facility];  Service: Gastroenterology;  Laterality: N/A;  inlet patch, normal esophagus    ENDOSCOPY N/A 10/24/2022    Procedure: ESOPHAGOGASTRODUODENOSCOPY;  Surgeon: Rene Lund MD;  [facility];  Service: Gastroenterology;  Laterality: N/A;  gastric polyp, retained food in stomach    HYSTERECTOMY      SHOULDER ARTHROSCOPY Right     TUBAL ABDOMINAL LIGATION      UPPER GASTROINTESTINAL ENDOSCOPY      WRIST ARTHROSCOPY Left         No current facility-administered medications for this encounter.    Current Outpatient Medications:     Calcium Carbonate-Vitamin D (OSCAL-500) 500-400 MG-UNIT per tablet, Take  by mouth., Disp: , Rfl:     clobetasol (TEMOVATE) 0.05 % cream, APPLY TO AFFECTED AREA OF ARMS TWICE DAILY FOR 3 WEEKS THEN AS NEEDED, Disp: , Rfl:     estradiol (MINIVELLE, VIVELLE-DOT) 0.05 MG/24HR patch, Place 1 patch on the skin as directed by provider 1 (One) Time Per Week., Disp: , Rfl:     GLUCOSAMINE-CHONDROITIN PO, Take  by mouth Daily., Disp: , Rfl:     hyoscyamine (LEVSIN) 0.125 MG SL tablet, Place 1 tab under the tongue up to 4 x daily as needed for esophageal spasms, Disp: 120 tablet, Rfl: 2    Multiple Vitamin (MULTI VITAMIN PO), Take  by mouth Daily., Disp: , Rfl:     pimecrolimus (ELIDEL) 1 % cream, APPLY TO AFFECTED AREA OF ARM TWICE DAILY AS NEEDED FOR MAINTENANCE, Disp: , Rfl:     spironolactone (ALDACTONE) 25 MG tablet, Take 1 tablet by mouth Daily., Disp: , Rfl:     Tirzepatide (Mounjaro) 15 MG/0.5ML solution pen-injector, Inject 15 mg under the skin into the appropriate area as directed 1 (One) Time Per Week., Disp: 2 mL, Rfl: 6    traZODone (DESYREL) 50 MG tablet, TAKE 1 TABLET BY MOUTH EVERYDAY AT BEDTIME, Disp: 90 tablet, Rfl: 2    vitamin C (ASCORBIC ACID) 250 MG tablet, Take 1 tablet by mouth Daily., Disp: , Rfl:     Allergies: No Known Allergies     The following portions of the patient's history were reviewed by me and updated as appropriate: review of systems, allergies, current medications, past family history, past medical history, past social history, past surgical history and problem list.    There were no vitals filed for this visit.    PHYSICAL EXAM:    CONSTITUTIONAL:  today's vital signs reviewed by me  GASTROINTESTINAL: abdomen is soft nontender nondistended with normal active bowel sounds, no masses are appreciated    Assessment/ Plan  Dysphagia    egd    Risks and benefits as well as  alternatives to endoscopic evaluation were explained to the patient and they voiced understanding and wish to proceed.  These risks include but are not limited to the risk of bleeding, perforation, adverse reaction to sedation, and missed lesions.  The patient was given the opportunity to ask questions prior to the endoscopic procedure.

## 2023-09-07 NOTE — ANESTHESIA POSTPROCEDURE EVALUATION
"Patient: Tushar Martinez    Procedure Summary       Date: 09/07/23 Room / Location[facility]    Anesthesia Start: 0900 Anesthesia Stop: 0920    Procedure: ESOPHAGOGASTRODUODENOSCOPY WITH SAVORY DILATATION Diagnosis:       Dysphagia, unspecified type      (Dysphagia, unspecified type [R13.10])    Surgeons: Rene Lund MD Provider: Ambrose Rosen MD    Anesthesia Type: MAC ASA Status: 2            Anesthesia Type: MAC    Vitals  No vitals data found for the desired time range.          Post Anesthesia Care and Evaluation    Patient location during evaluation: bedside  Patient participation: complete - patient participated  Level of consciousness: awake and alert  Pain management: adequate    Airway patency: patent  Anesthetic complications: No anesthetic complications  PONV Status: controlled  Cardiovascular status: acceptable  Respiratory status: acceptable  Hydration status: acceptable    Comments: BP 97/64 (BP Location: Left arm, Patient Position: Lying)   Pulse 63   Temp 36.2 °C (97.1 °F) (Temporal)   Resp 16   Ht 162.6 cm (64\")   Wt 55.5 kg (122 lb 6.4 oz)   SpO2 94%   BMI 21.01 kg/m²       "

## 2023-09-08 LAB
LAB AP CASE REPORT: NORMAL
PATH REPORT.FINAL DX SPEC: NORMAL
PATH REPORT.GROSS SPEC: NORMAL

## 2023-10-17 ENCOUNTER — OFFICE VISIT (OUTPATIENT)
Dept: GASTROENTEROLOGY | Facility: CLINIC | Age: 53
End: 2023-10-17
Payer: COMMERCIAL

## 2023-10-17 VITALS
HEART RATE: 54 BPM | WEIGHT: 127.9 LBS | HEIGHT: 64 IN | BODY MASS INDEX: 21.83 KG/M2 | DIASTOLIC BLOOD PRESSURE: 72 MMHG | SYSTOLIC BLOOD PRESSURE: 132 MMHG | TEMPERATURE: 98.4 F | OXYGEN SATURATION: 100 %

## 2023-10-17 DIAGNOSIS — R11.0 NAUSEA: Chronic | ICD-10-CM

## 2023-10-17 DIAGNOSIS — R13.10 DYSPHAGIA, UNSPECIFIED TYPE: Primary | Chronic | ICD-10-CM

## 2023-10-17 DIAGNOSIS — K31.84 GASTROPARESIS: Chronic | ICD-10-CM

## 2023-10-17 DIAGNOSIS — K59.00 CONSTIPATION, UNSPECIFIED CONSTIPATION TYPE: Chronic | ICD-10-CM

## 2023-10-17 PROCEDURE — 99214 OFFICE O/P EST MOD 30 MIN: CPT | Performed by: NURSE PRACTITIONER

## 2023-10-17 NOTE — PATIENT INSTRUCTIONS
For constipation, continue MiraLax.  We will assess your thyroid level today to ensure that your level is within normal limits and to ensure that it is not an underlying reason for your constipation.     2.   Continue to follow swallowing precaution-chewing your food thoroughly, using a water wash between bites of food, eating more slowly, and remaining upright after meals. You may continue to use Levsin if needed for dysphagia.     3.  Next colonoscopy due November 2027.

## 2023-10-17 NOTE — PROGRESS NOTES
"Chief Complaint   Patient presents with    Follow-up     Dysphagia, constipation         History of Present Illness  53-year old female presents to the office today for follow up. She was last seen in office on 3/22/2023. She has a history of dysphagia. Low to normal LES pressure was noted on esophageal manometry 1/26/2023. Esophagram 6/29/2022 revealed mild tertiary wave formation with subtle esophageal reflux. VSS 11/23/2022 revealed some residue in the upper esophagus that passed with a water wash.     She underwent EGD 9/7/2023 revealing an inlet patch in the esophagus and gastritis, and she underwent esophageal dilation to 48Fr. She reports the dilation has helped significantly. Breads, meats, and pasta were most problematic. She can still tell that \"its there\" but symptoms are much better. She is able to swallow pills without difficulty. She does take Levsin on an as needed basis for dysphagia. She is not currently taking any type of antacid type of medication as she does not feel like she needs any.     Last colonoscopy was performed on 11/02/2017 revealing non-bleeding internal hemorrhoids. No specimens collected. Next c-scope due November 2027. She reports constipation and takes MiraLax 1 capful daily. She is having BM daily to every other day. If she does not take MiraLax she can go 3-4 days between Bms. She has a history of bloating-but MiraLax helps. Fiber has given her gas in the past.     Result Review :       Office Visit with Rocío Thornton APRN (03/22/2023)   UPPER GI ENDOSCOPY (09/07/2023 08:49)   Tissue Pathology Exam (09/07/2023 09:16)   Tissue Pathology Exam (10/24/2022 09:21)   UPPER GI ENDOSCOPY (10/24/2022 09:06)   Manometry (esophageal) (11/03/2022 13:10)   SCANNED - COLONOSCOPY (11/02/2017)   SCANNED PATHOLOGY (11/02/2017)   Vital Signs:   /72   Pulse 54   Temp 98.4 °F (36.9 °C)   Ht 162.6 cm (64\")   Wt 58 kg (127 lb 14.4 oz)   SpO2 100%   BMI 21.95 kg/m²     Body mass index " is 21.95 kg/m².     Physical Exam  Vitals reviewed.   Constitutional:       Appearance: Normal appearance.   Pulmonary:      Effort: Pulmonary effort is normal. No respiratory distress.   Abdominal:      General: Abdomen is flat. Bowel sounds are normal. There is no distension.      Palpations: Abdomen is soft. There is no mass.      Tenderness: There is no abdominal tenderness. There is no guarding.   Musculoskeletal:         General: Normal range of motion.   Skin:     General: Skin is warm and dry.   Neurological:      General: No focal deficit present.      Mental Status: She is alert and oriented to person, place, and time.   Psychiatric:         Mood and Affect: Mood normal.         Behavior: Behavior normal.         Thought Content: Thought content normal.         Judgment: Judgment normal.       Assessment and Plan    Diagnoses and all orders for this visit:    1. Dysphagia, unspecified type (Primary)    2. Nausea    3. Constipation, unspecified constipation type  -     TSH Rfx On Abnormal To Free T4    4. Gastroparesis           Patient Instructions   For constipation, continue MiraLax.  We will assess your thyroid level today to ensure that your level is within normal limits and to ensure that it is not an underlying reason for your constipation.     2.   Continue to follow swallowing precaution-chewing your food thoroughly, using a water wash between bites of food, eating more slowly, and remaining upright after meals. You may continue to use Levsin if needed for dysphagia.     3.  Next colonoscopy due November 2027.       Discussion:    EGD findings and pathology reviewed with patient today during her follow-up visit.  Patient's dysphagia has significantly improved following esophageal dilation.  Occasionally she will have some very mild reminder that this symptom is still present, but she is not having any difficulty swallowing foods or medication.  Liquids have never been a problem.  She denies any  heartburn or reflux issues.  She may continue to use Levsin if needed for very mild dysphagia and was instructed to contact the office should her symptoms worsen.    For constipation patient to continue MiraLAX.  We will check a thyroid level today with reflex to T4.  She has had a fluctuating thyroid level in the past and it has been almost a year since her last set of lab work.  We will contact her with results and refer her to PCP if needed based upon findings.    Next colonoscopy due November 2027 for colon cancer screening and she is in recall accordingly.  Recommend office follow-up as needed based upon symptoms.  Patient verbalized understanding of above plan of care and is in agreement.  All questions answered and support provided.  EMR Dragon/Transcription Disclaimer:  This document has been Dictated utilizing Dragon dictation.

## 2023-10-18 LAB
T4 FREE SERPL-MCNC: 1.1 NG/DL (ref 0.93–1.7)
TSH SERPL DL<=0.005 MIU/L-ACNC: 7.29 UIU/ML (ref 0.27–4.2)

## 2023-10-19 ENCOUNTER — TELEPHONE (OUTPATIENT)
Dept: INTERNAL MEDICINE | Facility: CLINIC | Age: 53
End: 2023-10-19
Payer: COMMERCIAL

## 2023-10-19 NOTE — TELEPHONE ENCOUNTER
[facility] office to make Dr. Salcido aware of patient abnormal TSH number from 10/17/2023. It is in the office notes/orders for the visit there on 10/17. Please advise.  Thank you!

## 2023-11-16 ENCOUNTER — OFFICE VISIT (OUTPATIENT)
Dept: INTERNAL MEDICINE | Facility: CLINIC | Age: 53
End: 2023-11-16
Payer: COMMERCIAL

## 2023-11-16 VITALS
WEIGHT: 129.3 LBS | DIASTOLIC BLOOD PRESSURE: 78 MMHG | HEART RATE: 66 BPM | HEIGHT: 64 IN | BODY MASS INDEX: 22.07 KG/M2 | OXYGEN SATURATION: 98 % | SYSTOLIC BLOOD PRESSURE: 130 MMHG

## 2023-11-16 DIAGNOSIS — Z00.00 WELL ADULT EXAM: Primary | ICD-10-CM

## 2023-11-16 PROCEDURE — 99396 PREV VISIT EST AGE 40-64: CPT | Performed by: INTERNAL MEDICINE

## 2023-11-16 RX ORDER — LEVOTHYROXINE SODIUM 0.1 MG/1
100 TABLET ORAL DAILY
Qty: 30 TABLET | Refills: 3 | Status: SHIPPED | OUTPATIENT
Start: 2023-11-16

## 2023-11-16 NOTE — PROGRESS NOTES
"Chief Complaint   Patient presents with    Annual Exam       Subjective   Tushar Martinez is a 53 y.o. female.     History of Present Illness  Hypothyroidism, noted on previous labs TSH in low 5 range; last visit check with GYN       The following portions of the patient's history were reviewed and updated as appropriate: allergies, current medications, past family history, past medical history, past social history, past surgical history, and problem list.    Review of Systems      Objective   Body mass index is 22.18 kg/m².   Vitals:    11/16/23 1257   BP: 130/78   BP Location: Left arm   Patient Position: Sitting   Cuff Size: Adult   Pulse: 66   SpO2: 98%   Weight: 58.7 kg (129 lb 4.8 oz)   Height: 162.6 cm (64.02\")        Physical Exam  Vitals and nursing note reviewed.   Constitutional:       Appearance: Normal appearance.   HENT:      Head: Normocephalic and atraumatic.      Right Ear: Tympanic membrane, ear canal and external ear normal.      Left Ear: Tympanic membrane, ear canal and external ear normal.      Nose: Nose normal.      Mouth/Throat:      Mouth: Mucous membranes are moist.      Pharynx: Oropharynx is clear.   Eyes:      Extraocular Movements: Extraocular movements intact.      Conjunctiva/sclera: Conjunctivae normal.      Pupils: Pupils are equal, round, and reactive to light.   Cardiovascular:      Rate and Rhythm: Normal rate and regular rhythm.      Pulses: Normal pulses.      Heart sounds: Normal heart sounds.   Pulmonary:      Effort: Pulmonary effort is normal.      Breath sounds: Normal breath sounds. No wheezing, rhonchi or rales.   Abdominal:      General: Abdomen is flat. There is no distension.      Palpations: Abdomen is soft.      Tenderness: There is no abdominal tenderness.   Musculoskeletal:         General: Normal range of motion.      Cervical back: Normal range of motion and neck supple.      Right lower leg: No edema.      Left lower leg: No edema.   Skin:     General: Skin is " warm and dry.      Capillary Refill: Capillary refill takes less than 2 seconds.      Findings: No rash.   Neurological:      General: No focal deficit present.      Mental Status: She is alert and oriented to person, place, and time. Mental status is at baseline.   Psychiatric:         Mood and Affect: Mood normal.         Behavior: Behavior normal.           Current Outpatient Medications:     Calcium Carbonate-Vitamin D (OSCAL-500) 500-400 MG-UNIT per tablet, Take  by mouth., Disp: , Rfl:     clobetasol (TEMOVATE) 0.05 % cream, APPLY TO AFFECTED AREA OF ARMS TWICE DAILY FOR 3 WEEKS THEN AS NEEDED, Disp: , Rfl:     estradiol (MINIVELLE, VIVELLE-DOT) 0.05 MG/24HR patch, Place 1 patch on the skin as directed by provider 1 (One) Time Per Week., Disp: , Rfl:     GLUCOSAMINE-CHONDROITIN PO, Take  by mouth Daily., Disp: , Rfl:     hyoscyamine (LEVSIN) 0.125 MG SL tablet, Place 1 tab under the tongue up to 4 x daily as needed for esophageal spasms, Disp: 120 tablet, Rfl: 2    Multiple Vitamin (MULTI VITAMIN PO), Take  by mouth Daily., Disp: , Rfl:     pimecrolimus (ELIDEL) 1 % cream, APPLY TO AFFECTED AREA OF ARM TWICE DAILY AS NEEDED FOR MAINTENANCE, Disp: , Rfl:     spironolactone (ALDACTONE) 25 MG tablet, Take 1 tablet by mouth Daily., Disp: , Rfl:     traZODone (DESYREL) 50 MG tablet, TAKE 1 TABLET BY MOUTH EVERYDAY AT BEDTIME, Disp: 90 tablet, Rfl: 2    vitamin C (ASCORBIC ACID) 250 MG tablet, Take 1 tablet by mouth Daily., Disp: , Rfl:     levothyroxine (Synthroid) 100 MCG tablet, Take 1 tablet by mouth Daily., Disp: 30 tablet, Rfl: 3    Tirzepatide (Mounjaro) 15 MG/0.5ML solution pen-injector, Inject 15 mg under the skin into the appropriate area as directed 1 (One) Time Per Week. (Patient not taking: Reported on 11/16/2023), Disp: 2 mL, Rfl: 6     Lab Results   Component Value Date    HGBA1C 5.50 10/03/2022    TSH 7.290 (H) 10/17/2023        Health Maintenance   Topic Date Due    LIPID PANEL  10/03/2023     MAMMOGRAM  10/26/2024    ANNUAL PHYSICAL  11/16/2024    COLORECTAL CANCER SCREENING  11/02/2027    TDAP/TD VACCINES (3 - Td or Tdap) 09/30/2030    HEPATITIS C SCREENING  Completed    COVID-19 Vaccine  Completed    INFLUENZA VACCINE  Completed    ZOSTER VACCINE  Completed    Pneumococcal Vaccine 0-64  Aged Out    PAP SMEAR  Discontinued        Immunization History   Administered Date(s) Administered    COVID-19 (PFIZER) BIVALENT 12+YRS 10/15/2022    COVID-19 (PFIZER) Purple Cap Monovalent 04/01/2021, 04/22/2021, 12/02/2021    COVID-19 F23 (PFIZER) 12YRS+ (COMIRNATY) 10/13/2023    Flu Vaccine Intradermal Quad 18-64YR 09/29/2022    Fluzone (or Fluarix & Flulaval for VFC) >6mos 09/06/2020, 08/28/2021, 09/30/2022    Fluzone Quad >6mos (Multi-dose) 09/16/2023    Hepatitis A 10/25/2018    Influenza Injectable Mdck Pf Quad 10/11/2017, 08/28/2019    Influenza Seasonal Injectable 10/26/2018    Influenza, Unspecified 08/28/2021    Shingrix 09/30/2022, 11/29/2022    Tdap 12/07/2018, 09/30/2020         Assessment & Plan   Diagnoses and all orders for this visit:    1. Well adult exam (Primary)  -     CBC & Differential  -     Comprehensive Metabolic Panel  -     Lipid Panel With / Chol / HDL Ratio  -     Vitamin D,25-Hydroxy  -     Hemoglobin A1c    Other orders  -     levothyroxine (Synthroid) 100 MCG tablet; Take 1 tablet by mouth Daily.  Dispense: 30 tablet; Refill: 3    - start levothyroxine, counseled on multiple abnormal labs over time; does have some symptoms, feeling cold, constipation  - recheck labs in 4 weeks         Well adult exam  - labs checked and evaluated    PAP - up to date , counseled  Mammogram - up to date, counseled  Colonoscopy - up todate  Glaucoma - yearly  AAA - na  Lung cancer - na  DXA - at 65  HIV - neg  HCV - neg  DM - checking  HLD - checking  Smoking - no  Depression - no, [id number]  Vaccines - counseled  Falls - no  Alcohol Screening - no    Discussed mental health, sexual health, substance use,  abuse, anticipatory guidance given.      No follow-ups on file.     Iam Salcido MD  [facility] [place] [facility]  [facility]  Phone: [phone]  Fax: [phone]

## 2023-11-17 LAB
25(OH)D3+25(OH)D2 SERPL-MCNC: 77.8 NG/ML (ref 30–100)
ALBUMIN SERPL-MCNC: 4.8 G/DL (ref 3.5–5.2)
ALBUMIN/GLOB SERPL: 2.7 G/DL
ALP SERPL-CCNC: 65 U/L (ref 39–117)
ALT SERPL-CCNC: 41 U/L (ref 1–33)
AST SERPL-CCNC: 30 U/L (ref 1–32)
BASOPHILS # BLD AUTO: 0.03 10*3/MM3 (ref 0–0.2)
BASOPHILS NFR BLD AUTO: 0.5 % (ref 0–1.5)
BILIRUB SERPL-MCNC: 0.3 MG/DL (ref 0–1.2)
BUN SERPL-MCNC: 19 MG/DL (ref 6–20)
BUN/CREAT SERPL: 21.8 (ref 7–25)
CALCIUM SERPL-MCNC: 9.6 MG/DL (ref 8.6–10.5)
CHLORIDE SERPL-SCNC: 101 MMOL/L (ref 98–107)
CHOLEST SERPL-MCNC: 209 MG/DL (ref 0–200)
CHOLEST/HDLC SERPL: 2.58 {RATIO}
CO2 SERPL-SCNC: 29.5 MMOL/L (ref 22–29)
CREAT SERPL-MCNC: 0.87 MG/DL (ref 0.57–1)
EGFRCR SERPLBLD CKD-EPI 2021: 79.3 ML/MIN/1.73
EOSINOPHIL # BLD AUTO: 0.06 10*3/MM3 (ref 0–0.4)
EOSINOPHIL NFR BLD AUTO: 1 % (ref 0.3–6.2)
ERYTHROCYTE [DISTWIDTH] IN BLOOD BY AUTOMATED COUNT: 12 % (ref 12.3–15.4)
GLOBULIN SER CALC-MCNC: 1.8 GM/DL
GLUCOSE SERPL-MCNC: 82 MG/DL (ref 65–99)
HBA1C MFR BLD: 5.1 % (ref 4.8–5.6)
HCT VFR BLD AUTO: 38.7 % (ref 34–46.6)
HDLC SERPL-MCNC: 81 MG/DL (ref 40–60)
HGB BLD-MCNC: 13.3 G/DL (ref 12–15.9)
IMM GRANULOCYTES # BLD AUTO: 0.02 10*3/MM3 (ref 0–0.05)
IMM GRANULOCYTES NFR BLD AUTO: 0.3 % (ref 0–0.5)
LDLC SERPL CALC-MCNC: 115 MG/DL (ref 0–100)
LYMPHOCYTES # BLD AUTO: 1.29 10*3/MM3 (ref 0.7–3.1)
LYMPHOCYTES NFR BLD AUTO: 21.4 % (ref 19.6–45.3)
MCH RBC QN AUTO: 32.4 PG (ref 26.6–33)
MCHC RBC AUTO-ENTMCNC: 34.4 G/DL (ref 31.5–35.7)
MCV RBC AUTO: 94.2 FL (ref 79–97)
MONOCYTES # BLD AUTO: 0.39 10*3/MM3 (ref 0.1–0.9)
MONOCYTES NFR BLD AUTO: 6.5 % (ref 5–12)
NEUTROPHILS # BLD AUTO: 4.23 10*3/MM3 (ref 1.7–7)
NEUTROPHILS NFR BLD AUTO: 70.3 % (ref 42.7–76)
NRBC BLD AUTO-RTO: 0 /100 WBC (ref 0–0.2)
PLATELET # BLD AUTO: 259 10*3/MM3 (ref 140–450)
POTASSIUM SERPL-SCNC: 4.5 MMOL/L (ref 3.5–5.2)
PROT SERPL-MCNC: 6.6 G/DL (ref 6–8.5)
RBC # BLD AUTO: 4.11 10*6/MM3 (ref 3.77–5.28)
SODIUM SERPL-SCNC: 141 MMOL/L (ref 136–145)
TRIGL SERPL-MCNC: 75 MG/DL (ref 0–150)
VLDLC SERPL CALC-MCNC: 13 MG/DL (ref 5–40)
WBC # BLD AUTO: 6.02 10*3/MM3 (ref 3.4–10.8)

## 2023-12-21 ENCOUNTER — TELEPHONE (OUTPATIENT)
Dept: INTERNAL MEDICINE | Facility: CLINIC | Age: 53
End: 2023-12-21

## 2023-12-21 NOTE — TELEPHONE ENCOUNTER
UNABLE TO WARM TRANSFER     Caller: Tushar Martinez    Relationship to patient: Self    Best call back number: [phone]     Patient is needing: RESCHEDULE LAB APPOINTMENT

## 2023-12-26 DIAGNOSIS — E03.9 HYPOTHYROIDISM, UNSPECIFIED TYPE: Primary | ICD-10-CM

## 2023-12-26 LAB
T4 FREE SERPL-MCNC: 1.56 NG/DL (ref 0.93–1.7)
TSH SERPL DL<=0.005 MIU/L-ACNC: 0.7 UIU/ML (ref 0.27–4.2)

## 2024-05-01 RX ORDER — TRAZODONE HYDROCHLORIDE 50 MG/1
50 TABLET ORAL
Qty: 90 TABLET | Refills: 2 | Status: CANCELLED | OUTPATIENT
Start: 2024-05-01

## 2024-05-02 ENCOUNTER — TELEPHONE (OUTPATIENT)
Dept: INTERNAL MEDICINE | Facility: CLINIC | Age: 54
End: 2024-05-02

## 2024-05-02 NOTE — TELEPHONE ENCOUNTER
Okay for hub to read.   Called patient and left voicemail stating that she needs to schedule an establish care appointment with one of  our providers before we will send in a refill.  Once she has that appointment scheduled , we will refill medication to last her until  her appointment date

## (undated) DEVICE — SINGLE-USE BIOPSY FORCEPS: Brand: RADIAL JAW 4

## (undated) DEVICE — ADAPT CLN SCPE ENDO PORPOISE BX/50 DISP

## (undated) DEVICE — FLEX ADVANTAGE 1500CC: Brand: FLEX ADVANTAGE

## (undated) DEVICE — KT ORCA ORCAPOD DISP STRL

## (undated) DEVICE — MSK ENDO PORT O2 POM ELITE CURAPLEX A/

## (undated) DEVICE — GOWN ISOL W/THUMB UNIV BLU BX/15

## (undated) DEVICE — VIAL FORMLN CAP 10PCT 20ML

## (undated) DEVICE — GOWN PROC ENDOARMOR GI LVL3 HY/SHLD UNIV

## (undated) DEVICE — CANN NASL CO2 TRULINK W/O2 A/

## (undated) DEVICE — Device

## (undated) DEVICE — BITEBLOCK OMNI BLOC